# Patient Record
Sex: FEMALE | Race: BLACK OR AFRICAN AMERICAN | Employment: FULL TIME | URBAN - METROPOLITAN AREA
[De-identification: names, ages, dates, MRNs, and addresses within clinical notes are randomized per-mention and may not be internally consistent; named-entity substitution may affect disease eponyms.]

---

## 2017-03-24 ENCOUNTER — OFFICE VISIT (OUTPATIENT)
Dept: INTERNAL MEDICINE CLINIC | Age: 32
End: 2017-03-24

## 2017-03-24 VITALS
DIASTOLIC BLOOD PRESSURE: 78 MMHG | TEMPERATURE: 98 F | HEIGHT: 62 IN | OXYGEN SATURATION: 98 % | HEART RATE: 87 BPM | SYSTOLIC BLOOD PRESSURE: 118 MMHG | RESPIRATION RATE: 16 BRPM | BODY MASS INDEX: 43.52 KG/M2 | WEIGHT: 236.5 LBS

## 2017-03-24 DIAGNOSIS — Z00.00 ROUTINE GENERAL MEDICAL EXAMINATION AT A HEALTH CARE FACILITY: Primary | ICD-10-CM

## 2017-03-24 NOTE — PATIENT INSTRUCTIONS

## 2017-03-24 NOTE — PROGRESS NOTES
1. Have you been to the ER, urgent care clinic since your last visit? Hospitalized since your last visit? No    2. Have you seen or consulted any other health care providers outside of the [address] [place] [address] since your last visit? Include any pap smears or colon screening.  No

## 2017-03-24 NOTE — PROGRESS NOTES
HPI:   Check up  Needs form completed stating she is low risk for active TB  w/o chest pain/abd. discomfort; no dyspnea, cough or pedal edema; denies constitutional complaints of fever, night sweats or wt loss; no evidence of GI/ hemorrhage; no polyuria/polydipsia. Activity is age appropriate. ROS is otherwise negative. Past Medical History:   Diagnosis Date    Asthma     Hirsutism     Mixed hyperlipidemia     Positive PPD 2006    rx'd with INH       History reviewed. No pertinent surgical history. Social History     Social History    Marital status: SINGLE     Spouse name: N/A    Number of children: N/A    Years of education: N/A     Occupational History    Not on file. Social History Main Topics    Smoking status: Never Smoker    Smokeless tobacco: Never Used    Alcohol use Yes      Comment: occasional ETOH    Drug use: No    Sexual activity: Yes     Partners: Male     Birth control/ protection: Pill     Other Topics Concern    Not on file     Social History Narrative       No Known Allergies    Family History   Problem Relation Age of Onset    Diabetes Mother     Hypertension Mother     Elevated Lipids Mother     Elevated Lipids Father        Current Outpatient Prescriptions   Medication Sig Dispense Refill    MICROGESTIN 1/20, 21, 1-20 mg-mcg tab   6           Visit Vitals    /78 (BP 1 Location: Left arm, BP Patient Position: Sitting)    Pulse 87    Temp 98 °F (36.7 °C) (Tympanic)    Resp 16    Ht 5' 2\" (1.575 m)    Wt 236 lb 8 oz (107.3 kg)    SpO2 98%    BMI 43.26 kg/m2       PE  Well nourished in NAD  HEENT: PERRLA, EOMI;  OP: clear. Neck: supple w/o mass or bruits. Chest: clear. CV: RRR w/o m,r,g; pulses intact. Abd: soft, NT, w/o HSM or mass. Ext: w/o edema. Neuro: NF. Assessment and Plan    Encounter Diagnoses   Name Primary?     Routine general medical examination at a health care facility Yes   Except for increased weight, pt appears well  Completed form stating she has no evidence of active TB  I have reviewed/discussed the above normal BMI with the patient. I have recommended the following interventions: dietary management education, guidance, and counseling . The plan is as follows: I have counseled this patient on diet and exercise regimens.  .    Labs soon to assess metabolic parameters  Gyn care done elsewhere  OV 12 mos or prn  I have explained plan to patient and the patient verbalizes understanding

## 2017-10-18 ENCOUNTER — OFFICE VISIT (OUTPATIENT)
Dept: INTERNAL MEDICINE CLINIC | Age: 32
End: 2017-10-18

## 2017-10-18 VITALS
WEIGHT: 246 LBS | TEMPERATURE: 98.1 F | HEART RATE: 95 BPM | OXYGEN SATURATION: 97 % | RESPIRATION RATE: 16 BRPM | DIASTOLIC BLOOD PRESSURE: 78 MMHG | HEIGHT: 62 IN | SYSTOLIC BLOOD PRESSURE: 110 MMHG | BODY MASS INDEX: 45.27 KG/M2

## 2017-10-18 DIAGNOSIS — J30.89 ENVIRONMENTAL AND SEASONAL ALLERGIES: ICD-10-CM

## 2017-10-18 DIAGNOSIS — H10.32 ACUTE CONJUNCTIVITIS OF LEFT EYE, UNSPECIFIED ACUTE CONJUNCTIVITIS TYPE: Primary | ICD-10-CM

## 2017-10-18 RX ORDER — CIPROFLOXACIN HYDROCHLORIDE 3.5 MG/ML
SOLUTION/ DROPS TOPICAL
Qty: 10 ML | Refills: 0 | Status: SHIPPED | OUTPATIENT
Start: 2017-10-18 | End: 2018-03-23

## 2017-10-18 NOTE — PROGRESS NOTES
1. Have you been to the ER, urgent care clinic since your last visit? Hospitalized since your last visit? Yes When: oct [facility] Reason for visit: sore throat    2. Have you seen or consulted any other health care providers outside of the [address] [place] [address] since your last visit? Include any pap smears or colon screening.  No

## 2017-10-18 NOTE — PROGRESS NOTES
HPI:   (L) eye irritation x 3 days w/o change in acuity, hx of FB  +seasonal allergies  No fever    ROS is otherwise negative. Past Medical History:   Diagnosis Date    Asthma     Hirsutism     Mixed hyperlipidemia     Positive PPD 2006    rx'd with INH       History reviewed. No pertinent surgical history. Social History     Social History    Marital status: SINGLE     Spouse name: N/A    Number of children: N/A    Years of education: N/A     Occupational History    Not on file. Social History Main Topics    Smoking status: Never Smoker    Smokeless tobacco: Never Used    Alcohol use Yes      Comment: occasional ETOH    Drug use: No    Sexual activity: Yes     Partners: Male     Birth control/ protection: Pill     Other Topics Concern    Not on file     Social History Narrative       No Known Allergies    Family History   Problem Relation Age of Onset    Diabetes Mother     Hypertension Mother     Elevated Lipids Mother     Elevated Lipids Father        Current Outpatient Prescriptions   Medication Sig Dispense Refill    MICROGESTIN 1/20, 21, 1-20 mg-mcg tab   6           Visit Vitals    /78 (BP 1 Location: Right arm, BP Patient Position: Sitting)    Pulse 95    Temp 98.1 °F (36.7 °C) (Tympanic)    Resp 16    Ht 5' 2\" (1.575 m)    Wt 246 lb (111.6 kg)    SpO2 97%    BMI 44.99 kg/m2       PE  Well nourished in NAD  HEENT: PERRLA, EOMI;  Mild conjunctival injection (L); OP: clear. Neck: supple w/o mass or bruits. Chest: clear. CV: RRR w/o m,r,g; pulses intact. Abd: soft, NT, w/o HSM or mass. Ext: w/o edema. Neuro: NF. Assessment and Plan    Encounter Diagnoses   Name Primary?     Acute conjunctivitis of left eye, unspecified acute conjunctivitis type Yes    Environmental and seasonal allergies      Conjunctivitis in setting of seasonal allergies  claritin prn; cipro eye drops 2 tid x 3 days; f/u worsening or unimproved 3 days  I have reviewed/discussed the above normal BMI with the patient. I have recommended the following interventions: dietary management education, guidance, and counseling .  .    OV 6 mos or prn  I have explained plan to patient and the patient verbalizes understanding

## 2017-10-18 NOTE — PATIENT INSTRUCTIONS
Pinkeye: Care Instructions  Your Care Instructions    Pinkeye is redness and swelling of the eye surface and the conjunctiva (the lining of the eyelid and the covering of the white part of the eye). Pinkeye is also called conjunctivitis. Pinkeye is often caused by infection with bacteria or a virus. Dry air, allergies, smoke, and chemicals are other common causes. Pinkeye often clears on its own in 7 to 10 days. Antibiotics only help if the pinkeye is caused by bacteria. Pinkeye caused by infection spreads easily. If an allergy or chemical is causing pinkeye, it will not go away unless you can avoid whatever is causing it. Follow-up care is a key part of your treatment and safety. Be sure to make and go to all appointments, and call your doctor if you are having problems. It's also a good idea to know your test results and keep a list of the medicines you take. How can you care for yourself at home? · Wash your hands often. Always wash them before and after you treat pinkeye or touch your eyes or face. · Use moist cotton or a clean, wet cloth to remove crust. Wipe from the inside corner of the eye to the outside. Use a clean part of the cloth for each wipe. · Put cold or warm wet cloths on your eye a few times a day if the eye hurts. · Do not wear contact lenses or eye makeup until the pinkeye is gone. Throw away any eye makeup you were using when you got pinkeye. Clean your contacts and storage case. If you wear disposable contacts, use a new pair when your eye has cleared and it is safe to wear contacts again. · If the doctor gave you antibiotic ointment or eyedrops, use them as directed. Use the medicine for as long as instructed, even if your eye starts looking better soon. Keep the bottle tip clean, and do not let it touch the eye area. · To put in eyedrops or ointment:  ¨ Tilt your head back, and pull your lower eyelid down with one finger.   ¨ Drop or squirt the medicine inside the lower lid.  ¨ Close your eye for 30 to 60 seconds to let the drops or ointment move around. ¨ Do not touch the ointment or dropper tip to your eyelashes or any other surface. · Do not share towels, pillows, or washcloths while you have pinkeye. When should you call for help? Call your doctor now or seek immediate medical care if:  · You have pain in your eye, not just irritation on the surface. · You have a change in vision or loss of vision. · You have an increase in discharge from the eye. · Your eye has not started to improve or begins to get worse within 48 hours after you start using antibiotics. · Pinkeye lasts longer than 7 days. Watch closely for changes in your health, and be sure to contact your doctor if you have any problems. Where can you learn more? Go to http://nita-sandip.info/. Enter Y392 in the search box to learn more about \"Pinkeye: Care Instructions. \"  Current as of: March 20, 2017  Content Version: 11.3  © 9515-2992 [employer]. Care instructions adapted under license by [employer] (which disclaims liability or warranty for this information). If you have questions about a medical condition or this instruction, always ask your healthcare professional. [address] any warranty or liability for your use of this information.

## 2018-03-23 ENCOUNTER — OFFICE VISIT (OUTPATIENT)
Dept: INTERNAL MEDICINE CLINIC | Age: 33
End: 2018-03-23

## 2018-03-23 VITALS
HEART RATE: 98 BPM | WEIGHT: 250 LBS | OXYGEN SATURATION: 98 % | DIASTOLIC BLOOD PRESSURE: 76 MMHG | HEIGHT: 62 IN | BODY MASS INDEX: 46.01 KG/M2 | TEMPERATURE: 98.6 F | SYSTOLIC BLOOD PRESSURE: 126 MMHG

## 2018-03-23 DIAGNOSIS — Z92.89 HISTORY OF POSITIVE PPD: Primary | ICD-10-CM

## 2018-03-23 NOTE — PROGRESS NOTES
Dane Aguilera is a 27 y.o. female presenting today for Ear Fullness (2 months ) and Physical (needs PPD Converter Exam )  . HPI:  Dane Aguilera presents to the office today for tuberculosis assessment. 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 She was treated with interferon x 9 months and reports not symptoms of TB since. She presents today denying any cough, hemoptysis, dyspnea, weight loss or fever. Review of Systems   Constitutional: Negative for chills, fever and weight loss. Respiratory: Negative for cough, sputum production and shortness of breath. Cardiovascular: Negative for chest pain and palpitations. No Known Allergies    Current Outpatient Prescriptions   Medication Sig Dispense Refill    MICROGESTIN 1/20, 21, 1-20 mg-mcg tab   6       Past Medical History:   Diagnosis Date    Asthma     Hirsutism     Mixed hyperlipidemia     Positive PPD 2006    rx'd with INH       History reviewed. No pertinent surgical history. Social History     Social History    Marital status: SINGLE     Spouse name: N/A    Number of children: N/A    Years of education: N/A     Occupational History    Not on file. Social History Main Topics    Smoking status: Never Smoker    Smokeless tobacco: Never Used    Alcohol use Yes      Comment: occasional ETOH    Drug use: No    Sexual activity: Yes     Partners: Male     Birth control/ protection: Pill     Other Topics Concern    Not on file     Social History Narrative       Patient does not have an advanced directive on file    Vitals:    03/23/18 1326   BP: 126/76   Pulse: 98   Temp: 98.6 °F (37 °C)   TempSrc: Tympanic   SpO2: 98%   Weight: 250 lb (113.4 kg)   Height: 5' 2\" (1.575 m)   PainSc:   0 - No pain       Physical Exam   Constitutional: No distress. HENT:   Right Ear: External ear normal.   Left Ear: External ear normal.   Bilateral ear exam normal   Cardiovascular: Normal rate, regular rhythm and normal heart sounds. Pulmonary/Chest: Effort normal and breath sounds normal.   Lymphadenopathy:     She has no cervical adenopathy. Nursing note and vitals reviewed. No visits with results within 3 Month(s) from this visit. Latest known visit with results is:    Hospital Outpatient Visit on 02/15/2016   Component Date Value Ref Range Status    Sodium 02/15/2016 138  136 - 145 mmol/L Final    Potassium 02/15/2016 4.3  3.5 - 5.5 mmol/L Final    Chloride 02/15/2016 103  100 - 108 mmol/L Final    CO2 02/15/2016 27  21 - 32 mmol/L Final    Anion gap 02/15/2016 8  3.0 - 18 mmol/L Final    Glucose 02/15/2016 87  74 - 99 mg/dL Final    BUN 02/15/2016 11  7.0 - 18 MG/DL Final    Creatinine 02/15/2016 0.90  0.6 - 1.3 MG/DL Final    BUN/Creatinine ratio 02/15/2016 12  12 - 20   Final    GFR est AA 02/15/2016 >60  >60 ml/min/1.73m2 Final    GFR est non-AA 02/15/2016 >60  >60 ml/min/1.73m2 Final    Comment: (NOTE)  Estimated GFR is calculated using the Modification of Diet in Renal   Disease (MDRD) Study equation, reported for both  Americans   (GFRAA) and non- Americans (GFRNA), and normalized to 1.73m2   body surface area. The physician must decide which value applies to   the patient. The MDRD study equation should only be used in   individuals age 25 or older. It has not been validated for the   following: pregnant women, patients with serious comorbid conditions,   or on certain medications, or persons with extremes of body size,   muscle mass, or nutritional status.  Calcium 02/15/2016 9.0  8.5 - 10.1 MG/DL Final    Bilirubin, total 02/15/2016 0.3  0.2 - 1.0 MG/DL Final    ALT (SGPT) 02/15/2016 23  13 - 56 U/L Final    AST (SGOT) 02/15/2016 15  15 - 37 U/L Final    Alk.  phosphatase 02/15/2016 46  45 - 117 U/L Final    Protein, total 02/15/2016 8.0  6.4 - 8.2 g/dL Final    Albumin 02/15/2016 3.6  3.4 - 5.0 g/dL Final    Globulin 02/15/2016 4.4* 2.0 - 4.0 g/dL Final    A-G Ratio 02/15/2016 0.8 0.8 - 1.7   Final    LIPID PROFILE 02/15/2016        Final    Cholesterol, total 02/15/2016 200* <200 MG/DL Final    Triglyceride 02/15/2016 58  <150 MG/DL Final    HDL Cholesterol 02/15/2016 73* 40 - 60 MG/DL Final    LDL, calculated 02/15/2016 115.4* 0 - 100 MG/DL Final    VLDL, calculated 02/15/2016 11.6  MG/DL Final    CHOL/HDL Ratio 02/15/2016 2.7  0 - 5.0   Final    WBC 02/15/2016 4.9  4.6 - 13.2 K/uL Final    RBC 02/15/2016 4.71  4.20 - 5.30 M/uL Final    HGB 02/15/2016 13.8  12.0 - 16.0 g/dL Final    HCT 02/15/2016 41.8  35.0 - 45.0 % Final    MCV 02/15/2016 88.7  74.0 - 97.0 FL Final    MCH 02/15/2016 29.3  24.0 - 34.0 PG Final    MCHC 02/15/2016 33.0  31.0 - 37.0 g/dL Final    RDW 02/15/2016 12.8  11.6 - 14.5 % Final    PLATELET 78/91/5271 800* 135 - 420 K/uL Final    MPV 02/15/2016 9.7  9.2 - 11.8 FL Final    NEUTROPHILS 02/15/2016 44  40 - 73 % Final    LYMPHOCYTES 02/15/2016 45  21 - 52 % Final    MONOCYTES 02/15/2016 10  3 - 10 % Final    EOSINOPHILS 02/15/2016 1  0 - 5 % Final    BASOPHILS 02/15/2016 0  0 - 2 % Final    ABS. NEUTROPHILS 02/15/2016 2.1  1.8 - 8.0 K/UL Final    ABS. LYMPHOCYTES 02/15/2016 2.2  0.9 - 3.6 K/UL Final    ABS. MONOCYTES 02/15/2016 0.5  0.05 - 1.2 K/UL Final    ABS. EOSINOPHILS 02/15/2016 0.1  0.0 - 0.4 K/UL Final    ABS. BASOPHILS 02/15/2016 0.0  0.0 - 0.06 K/UL Final    DF 02/15/2016 AUTOMATED    Final    TSH 02/15/2016 0.87  0.36 - 3.74 uIU/mL Final       .No results found for any visits on 03/23/18. Assessment / Plan:      ICD-10-CM ICD-9-CM    1. History of positive PPD R76.11 795.51      TB assessment completed  No signs or symptoms of active TB  F/u prn    Follow-up Disposition:  Return if symptoms worsen or fail to improve. I asked the patient if she  had any questions and answered her  questions.   The patient stated that she understands the treatment plan and agrees with the treatment plan

## 2021-07-17 ENCOUNTER — HOSPITAL ENCOUNTER (OUTPATIENT)
Dept: LAB | Age: 36
Discharge: HOME | End: 2021-07-17

## 2021-07-17 LAB — SENTARA SPECIMEN COL,SENBCF: NORMAL

## 2021-07-17 PROCEDURE — 99001 SPECIMEN HANDLING PT-LAB: CPT

## 2021-09-11 ENCOUNTER — HOSPITAL ENCOUNTER (OUTPATIENT)
Dept: LAB | Age: 36
Discharge: HOME | End: 2021-09-11

## 2022-03-30 ENCOUNTER — OFFICE VISIT (OUTPATIENT)
Dept: INTERNAL MEDICINE CLINIC | Age: 37
End: 2022-03-30
Payer: COMMERCIAL

## 2022-03-30 VITALS
RESPIRATION RATE: 18 BRPM | OXYGEN SATURATION: 98 % | BODY MASS INDEX: 48.58 KG/M2 | TEMPERATURE: 97.4 F | HEART RATE: 91 BPM | WEIGHT: 264 LBS | DIASTOLIC BLOOD PRESSURE: 81 MMHG | HEIGHT: 62 IN | SYSTOLIC BLOOD PRESSURE: 125 MMHG

## 2022-03-30 DIAGNOSIS — E66.9 OBESITY (BMI 30-39.9): ICD-10-CM

## 2022-03-30 DIAGNOSIS — R05.9 COUGH: ICD-10-CM

## 2022-03-30 DIAGNOSIS — R31.29 MICROSCOPIC HEMATURIA: ICD-10-CM

## 2022-03-30 DIAGNOSIS — H40.9 GLAUCOMA, UNSPECIFIED GLAUCOMA TYPE, UNSPECIFIED LATERALITY: ICD-10-CM

## 2022-03-30 DIAGNOSIS — D75.839 THROMBOCYTOSIS: ICD-10-CM

## 2022-03-30 DIAGNOSIS — E78.2 MIXED HYPERLIPIDEMIA: ICD-10-CM

## 2022-03-30 DIAGNOSIS — J45.20 MILD INTERMITTENT ASTHMA WITHOUT COMPLICATION: ICD-10-CM

## 2022-03-30 DIAGNOSIS — R41.840 DIFFICULTY CONCENTRATING: Primary | ICD-10-CM

## 2022-03-30 DIAGNOSIS — F90.9 ATTENTION DEFICIT HYPERACTIVITY DISORDER (ADHD), UNSPECIFIED ADHD TYPE: ICD-10-CM

## 2022-03-30 PROCEDURE — 99204 OFFICE O/P NEW MOD 45 MIN: CPT | Performed by: INTERNAL MEDICINE

## 2022-03-30 RX ORDER — ALBUTEROL SULFATE 90 UG/1
2 AEROSOL, METERED RESPIRATORY (INHALATION)
Qty: 1 EACH | Refills: 5 | Status: SHIPPED | OUTPATIENT
Start: 2022-03-30

## 2022-03-30 RX ORDER — BRIMONIDINE TARTRATE, TIMOLOL MALEATE 2; 5 MG/ML; MG/ML
SOLUTION/ DROPS OPHTHALMIC
COMMUNITY
Start: 2022-03-21

## 2022-03-30 NOTE — PROGRESS NOTES
Cresencio Carr presents today for   Chief Complaint   Patient presents with    New Patient    Behavioral Problem     Patient wants to be screened for ADHD       1. \"Have you been to the ER, urgent care clinic since your last visit? Hospitalized since your last visit? \" new patient    2. \"Have you seen or consulted any other health care providers outside of the [address] [place] [address] since your last visit? \" yes     3. For patients aged 39-70: Has the patient had a colonoscopy / FIT/ Cologuard? NA - based on age      If the patient is female:    4. For patients aged 41-77: Has the patient had a mammogram within the past 2 years? NA - based on age or sex  See top three    5. For patients aged 21-65: Has the patient had a pap smear?  Yes - no Care Gap present

## 2022-03-30 NOTE — PROGRESS NOTES
[facility] OF [facility]:  3/30/2022, MRN: [id number]      Job Moore is a 43 y.o. female and presents to clinic as a New Patient and Behavioral Problem (Patient wants to be screened for ADHD)      Subjective:   She is a 43yo female with h/o thrombocytosis, previous positive PPD, glaucoma, microscopic hematuria (evaluated by [facility]), asthma, ADHD, allergic rhinitis, HLD, and obesity. 1. Thrombocytosis: Followed by [facility]. Next apt is in May. She has more frequent HAs. 2. Cough: Present since October. No SOB. Sx are more pronounced with going to work. Sputum: \"Sometimes in the morning. \" +Covid infection hx x 2. +Vaccinated against Covid infection. Her CXR from 10/21 was unremarkable. She had another one recently through her job. She had a positive PPD 16 yrs ago. She completed rx. No wheezing. Not on any rx for allergies. S/p turbinate reduction given her h/o allergy sx. 3. Health Maintenance:  - Her last PAP: December 2021. It was normal.  - No tobacco. She rarely drinks ETOH. No drug use   - No energy drinks. 4. H/o Microscopic Hematuria: She has a h/o microscopic hematuria - followed by Urology in the past. No urinary sx. 5. ADHD: She has a hard time getting to work on time. She has worked at the same place for >10 yrs. \"My sleep sometimes gets disturbed by the dog. \" \"I just can't get out of bed. \" No depression. She is doing well with completing tasks at work. She is taking classes at home. She has issues with tardiness even when she was younger. She works as an  at [employer]. \"It's not a bad job. It's redundant. \"     6. Asthma: She does not need an albuterol inhaler. +Exercise induced. She had it in her teens. She does not wheeze unless triggered from cold weather these days. 7. Glaucoma: Followed by . +Wearing glasses. On combigan rx. She sees her doctor every 3 months. 8. HLD: Her LDL was 115 in 2016. Not on rx. She went to a nutritionist in the past. She wants to lose weight. BMI is 48. Weight is 264lbs. Patient Active Problem List    Diagnosis Date Noted    Asthma     Mixed hyperlipidemia     Hirsutism        Current Outpatient Medications   Medication Sig Dispense Refill    Combigan 0.2-0.5 % drop ophthalmic solution          No Known Allergies    Past Medical History:   Diagnosis Date    Arthritis 12/6/13    R ankle    Asthma     Hirsutism     Microhematuria     Mixed hyperlipidemia     Positive PPD 2006    rx'd with INH       Past Surgical History:   Procedure Laterality Date    HX BREAST REDUCTION  2011    HX HEENT  2011    Nasal Turbinate    HX ORTHOPAEDIC Right 2014    Ankle     HX SEPTOPLASTY  2012       Family History   Problem Relation Age of Onset    Diabetes Mother     Hypertension Mother    [facility] Elevated Lipids Mother     Kidney Disease Mother     Elevated Lipids Father        Social History     Tobacco Use    Smoking status: Never Smoker    Smokeless tobacco: Never Used   Substance Use Topics    Alcohol use: Yes     Alcohol/week: 0.0 standard drinks     Comment: occasional ETOH       ROS   Review of Systems   Constitutional: Negative for chills and fever. HENT: Negative for ear pain and sore throat. Eyes: Negative for blurred vision and pain. Respiratory: Positive for cough. Negative for shortness of breath. Cardiovascular: Negative for chest pain. Gastrointestinal: Negative for abdominal pain, blood in stool and melena. Genitourinary: Negative for dysuria and hematuria. Musculoskeletal: Negative for joint pain and myalgias. Skin: Negative for rash. Neurological: Positive for headaches. Endo/Heme/Allergies: Does not bruise/bleed easily. Psychiatric/Behavioral: Negative for depression and substance abuse.        Objective     Vitals:    03/30/22 1139   BP: 125/81   Pulse: 91   Resp: 18   Temp: 97.4 °F (36.3 °C)   TempSrc: Temporal   SpO2: 98%   Weight: 264 lb (119.7 kg)   Height: 5' 2\" (1.575 m)   PainSc:   0 - No pain   LMP: 03/03/2022       Physical Exam  Vitals and nursing note reviewed. HENT:      Head: Normocephalic and atraumatic. Right Ear: External ear normal.      Left Ear: External ear normal.   Eyes:      General: No scleral icterus. Right eye: No discharge. Left eye: No discharge. Conjunctiva/sclera: Conjunctivae normal.   Cardiovascular:      Rate and Rhythm: Normal rate and regular rhythm. Heart sounds: Normal heart sounds. No murmur heard. No friction rub. No gallop. Pulmonary:      Effort: Pulmonary effort is normal. No respiratory distress. Breath sounds: Normal breath sounds. No wheezing or rales. Abdominal:      General: Bowel sounds are normal. There is no distension. Palpations: Abdomen is soft. There is no mass. Tenderness: There is no abdominal tenderness. There is no guarding or rebound. Musculoskeletal:         General: No swelling (BUE) or tenderness (BUE). Cervical back: Neck supple. Lymphadenopathy:      Cervical: No cervical adenopathy. Skin:     General: Skin is warm and dry. Findings: No erythema or rash. Neurological:      Mental Status: She is alert. Motor: No abnormal muscle tone.       Gait: Gait normal.   Psychiatric:         Mood and Affect: Mood normal.         LABS   Data Review:   Lab Results   Component Value Date/Time    WBC 4.9 02/15/2016 11:45 AM    HGB 13.8 02/15/2016 11:45 AM    HCT 41.8 02/15/2016 11:45 AM    PLATELET 876 (H) 54/01/5495 11:45 AM    MCV 88.7 02/15/2016 11:45 AM       Lab Results   Component Value Date/Time    Sodium 138 02/15/2016 11:45 AM    Potassium 4.3 02/15/2016 11:45 AM    Chloride 103 02/15/2016 11:45 AM    CO2 27 02/15/2016 11:45 AM    Anion gap 8 02/15/2016 11:45 AM    Glucose 87 02/15/2016 11:45 AM    BUN 11 02/15/2016 11:45 AM    Creatinine 0.90 02/15/2016 11:45 AM    BUN/Creatinine ratio 12 02/15/2016 11:45 AM    GFR est AA >60 02/15/2016 11:45 AM    GFR est non-AA >60 02/15/2016 11:45 AM    Calcium 9.0 02/15/2016 11:45 AM       Lab Results   Component Value Date/Time    Cholesterol, total 200 (H) 02/15/2016 11:45 AM    HDL Cholesterol 73 (H) 02/15/2016 11:45 AM    LDL, calculated 115.4 (H) 02/15/2016 11:45 AM    VLDL, calculated 11.6 02/15/2016 11:45 AM    Triglyceride 58 02/15/2016 11:45 AM    CHOL/HDL Ratio 2.7 02/15/2016 11:45 AM       No results found for: HBA1C, PRD7CCND, [id number]    Assessment/Plan:   1. Difficulty concentrating: Environmental reasons? - Referral placed for a neuropsychological assessment for evaluation/completeness  - Good sleep hygiene recommended. ORDERS:  - REFERRAL TO NEUROPSYCHOLOGY    2. Mixed hyperlipidemia: Overdue for labs. - Checking labs. - I encouraged her to reduce her weight  - We discussed weight loss options, including seeing a nutritionist again and pharmacotherapy options. She will let me know if she is interested in any of these options in the future. ORDERS:  - METABOLIC PANEL, COMPREHENSIVE; Future  - HEMOGLOBIN A1C WITH EAG; Future  - LIPID PANEL; Future    3. Thrombocytosis:   - Requesting records from Hematology  - Checking a [address] to f/u with Hematology for routine check ups. ORDERS:  - CBC WITH AUTOMATED DIFF; Future    4. Cough and Asthma Hx:  - Ok to use albuterol prn. Albuterol ordered. - Notify me if sx worsen. 5. Microscopic hematuria: Asymptomatic. Observation. 6. Glaucoma: Stable. - C/w rx eye drops and routine eye exams  - Requesting records. 7. Health Maintenance:  - Requesting her last PAP      Health Maintenance Due   Topic Date Due    Hepatitis C Screening  Never done    COVID-19 Vaccine (1) Never done     Lab review: labs are reviewed in the EHR and ordered as mentioned above    I have discussed the diagnosis with the patient and the intended plan as seen in the above orders.   The patient has received an after-visit summary and questions were answered concerning future plans. I have discussed medication side effects and warnings with the patient as well. I have reviewed the plan of care with the patient, accepted their input and they are in agreement with the treatment goals. All questions were answered. The patient understands the plan of care. Handouts provided today with above information. Pt instructed if symptoms worsen to call the office or report to the ED for continued care. Greater than 50% of the visit time was spent in counseling and/or coordination of care. Voice recognition was used to generate this report, which may have resulted in some phonetic based errors in grammar and contents. Even though attempts were made to correct all the mistakes, some may have been missed, and remained in the body of the document.           Nisha Dickerson MD

## 2022-03-30 NOTE — PATIENT INSTRUCTIONS
Complete Blood Count (CBC): About This Test  What is it? A complete blood count (CBC) is a blood test that gives important information about your blood cells, especially red blood cells, white blood cells, and platelets. Why is this test done? A CBC may be done as part of a regular physical exam. There are many other reasons that a doctor may want this blood test, including to:  · Find the cause of symptoms such as fatigue, weakness, fever, bruising, or weight loss. · Check for anemia. · See how much blood has been lost if there is bleeding. · Diagnose polycythemia. · Check for an infection. · Diagnose diseases of the blood, such as leukemia. · Check how the body is dealing with some types of drug or radiation treatment. · Check how abnormal bleeding is affecting the blood cells and counts. · Screen for high and low values before a surgery. · See if there are too many or too few of certain types of cells. This may help find other conditions. For instance, too many eosinophils may be a sign of an allergy or asthma. A blood count can give valuable information about the general state of your health. How do you prepare for the test?  In general, there's nothing you have to do before this test, unless your doctor tells you to. How is the test done? A health professional uses a needle to take a blood sample, usually from the arm. What happens after the test?  · You will probably be able to go home right away. · You can go back to your usual activities right away. Follow-up care is a key part of your treatment and safety. Be sure to make and go to all appointments, and call your doctor if you are having problems. It's also a good idea to keep a list of the medicines you take. Ask your doctor when you can expect to have your test results. Where can you learn more?   Go to http://www.gray.com/  Enter W454 in the search box to learn more about \"Complete Blood Count (CBC): About This Test.\"  Current as of: June 17, 2021               Content Version: 13.2  © 0694-1552 Healthwise, Incorporated. Care instructions adapted under license by [employer] (which disclaims liability or warranty for this information). If you have questions about a medical condition or this instruction, always ask your healthcare professional. [place] [address] any warranty or liability for your use of this information.

## 2022-03-31 ENCOUNTER — PATIENT MESSAGE (OUTPATIENT)
Dept: NEUROLOGY | Age: 37
End: 2022-03-31

## 2022-04-07 PROBLEM — R31.29 MICROSCOPIC HEMATURIA: Status: ACTIVE | Noted: 2022-04-07

## 2022-04-07 PROBLEM — F90.9 ATTENTION DEFICIT HYPERACTIVITY DISORDER (ADHD): Status: ACTIVE | Noted: 2022-04-07

## 2022-04-07 PROBLEM — D75.839 THROMBOCYTOSIS: Status: ACTIVE | Noted: 2022-04-07

## 2022-04-07 PROBLEM — H40.9 GLAUCOMA: Status: ACTIVE | Noted: 2022-04-07

## 2022-04-11 PROBLEM — H40.9 GLAUCOMA: Status: ACTIVE | Noted: 2022-04-07

## 2022-04-11 PROBLEM — F90.9 ATTENTION DEFICIT HYPERACTIVITY DISORDER (ADHD): Status: ACTIVE | Noted: 2022-04-07

## 2022-04-11 PROBLEM — D75.839 THROMBOCYTOSIS: Status: ACTIVE | Noted: 2022-04-07

## 2022-04-11 PROBLEM — R31.29 MICROSCOPIC HEMATURIA: Status: ACTIVE | Noted: 2022-04-07

## 2022-05-05 ENCOUNTER — OFFICE VISIT (OUTPATIENT)
Dept: NEUROLOGY | Age: 37
End: 2022-05-05
Payer: COMMERCIAL

## 2022-05-05 DIAGNOSIS — R41.840 ATTENTION AND CONCENTRATION DEFICIT: Primary | ICD-10-CM

## 2022-05-05 PROCEDURE — 90791 PSYCH DIAGNOSTIC EVALUATION: CPT | Performed by: PSYCHOLOGIST

## 2022-05-05 NOTE — PROGRESS NOTES
[facility]  Neuroscience   [facility]. [facility], [address]  Office:  [phone]  Fax: [phone]                  Initial Office Exam  Patient Name: Yana Deng  Age: 43 y.o. Gender: female   Handedness: left handed   Presenting Concern: attention and concentration deficits  Primary Care Physician: Cornelius Hernandez MD  Referring Provider: Cornelius Hernandez MD      REASON FOR REFERRAL:  This comprehensive and medically necessary neuropsychological assessment was requested to assist a differential diagnosis of cognitive concerns. The use and purpose of this examination, as well as the extent and limitations of confidentiality, were explained prior to obtaining permission to participate. Instructions were provided regarding the necessity to put forth optimal effort and answer questions truthfully in order to obtain reliable and accurate test results. REVIEW OF RECORDS:  Ms. Emerald Harper was referred by her PCP for work-up of possible ADHD. She is having a hard time getting to work on time and completing tasks. A history of glaucoma is noted. Current Outpatient Medications   Medication Sig    Combigan 0.2-0.5 % drop ophthalmic solution     albuterol (PROVENTIL HFA, VENTOLIN HFA, PROAIR HFA) 90 mcg/actuation inhaler Take 2 Puffs by inhalation every six (6) hours as needed for Wheezing. No current facility-administered medications for this visit. Past Medical History:   Diagnosis Date    Arthritis 12/6/13    R ankle    Asthma     Hirsutism     Microhematuria     Mixed hyperlipidemia     Positive PPD 2006    rx'd with [facility]       Past Surgical History:   Procedure Laterality Date    HX BREAST REDUCTION  2011    HX HEENT  2011    Nasal Turbinate    HX ORTHOPAEDIC Right 2014    Ankle     HX SEPTOPLASTY  2012       CLINICAL INTERVIEW:  Ms. Emerald Harper was unaccompanied for her initial visit.   Consistent with records, she reported difficulties with attention and concentration. She has never before been evaluated for, or diagnosed with ADHD. Developmental history is unremarkable. Ms. Khushi Ernst was born on time and proceeded to meet developmental milestones as expected. She never required skilled therapies but does believe she would benefit from ST for a tongue-tie. Neurologic history is negative for seizures, stroke, syncope, and significant head trauma. Sleep disturbance is significant for snoring. There has been no previous sleep study. Pain complaints include occasional headaches. Alcohol use is rare. There is no tobacco or illicit substance use. Family history of neurologic illness was denied. With regard to emotional functioning, Ms. Rankin denied a history of psychiatric illness, psychiatric hospitalization, suicidal ideation, self-harm behaviors, psychotic symptoms, and psychological trauma. Socially, Ms. Bonilla has never been  and has no children. She lives with her sister. She has never had a group of friends but does desire increased social support. Family support was described as good. Mr. Mario Kay does not exercise regularly. Hobbies include tennis, basketball, and bowling as well as listening to music. Academically, Ms. Rankin completed her bachelor's degree at [address] [place] [address] in the area of athletic training. She has no history of LD. Vocationally, Ms. Khushi Ernst has been employed for more than 10 years at [employer] as an . Functionally, Ms. Khushi Ernst remains independent for ADL and IADL care. She continues to drive without incident.     MENTAL STATUS:    Sensorium  Awake, Aware, Alert   Orientation person, place, time/date, situation, day of week, month of year and year   Relations cooperative   Eye Contact appropriate   Appearance:  age appropriate   Motor Behavior:  within normal limits   Speech:  monotone   Vocabulary average   Thought Process: within normal limits   Thought Content free of delusions and free of hallucinations   Suicidal ideations none   Homicidal ideations none   Mood:  euthymic   Affect:  constricted   Memory recent  adequate   Memory remote:  adequate   Concentration:  adequate   Abstraction:  abstract   Insight:  fair   Reliability fair   Judgment:  fair         DIAGNOSTIC IMPRESSIONS:  1. R/O ADHD      PLAN:  1. Complete a comprehensive neuropsychological assessment to provide a differential diagnosis of presenting concerns as well as to assist with disposition and treatment planning as appropriate. 2. Consider compensatory and remedial cognitive training. 3. Consider nonpharmacological interventions for mood disorder. [id number] x 1 Review of records. Face to face interview w/ patient. Determine test protocol: 60 minutes. Total 1 unit      Tim Andrew, PHD  Licensed Clinical Psychologist    This note was created using voice recognition software. Despite editing, there may be syntax errors.

## 2022-05-28 ENCOUNTER — HOSPITAL ENCOUNTER (OUTPATIENT)
Dept: LAB | Age: 37
Discharge: HOME | End: 2022-05-28
Payer: COMMERCIAL

## 2022-05-28 DIAGNOSIS — E78.2 MIXED HYPERLIPIDEMIA: ICD-10-CM

## 2022-05-28 DIAGNOSIS — E66.9 OBESITY (BMI 30-39.9): ICD-10-CM

## 2022-05-28 DIAGNOSIS — D75.839 THROMBOCYTOSIS: ICD-10-CM

## 2022-05-28 LAB
ALBUMIN SERPL-MCNC: 3.5 G/DL (ref 3.4–5)
ALBUMIN/GLOB SERPL: 0.9 {RATIO} (ref 0.8–1.7)
ALP SERPL-CCNC: 46 U/L (ref 45–117)
ALT SERPL-CCNC: 23 U/L (ref 13–56)
ANION GAP SERPL CALC-SCNC: 5 MMOL/L (ref 3–18)
AST SERPL-CCNC: 14 U/L (ref 10–38)
BASOPHILS # BLD: 0 K/UL (ref 0–0.1)
BASOPHILS NFR BLD: 1 % (ref 0–2)
BILIRUB SERPL-MCNC: 0.2 MG/DL (ref 0.2–1)
BUN SERPL-MCNC: 15 MG/DL (ref 7–18)
BUN/CREAT SERPL: 18 (ref 12–20)
CALCIUM SERPL-MCNC: 8.8 MG/DL (ref 8.5–10.1)
CHLORIDE SERPL-SCNC: 106 MMOL/L (ref 100–111)
CHOLEST SERPL-MCNC: 209 MG/DL
CO2 SERPL-SCNC: 27 MMOL/L (ref 21–32)
CREAT SERPL-MCNC: 0.83 MG/DL (ref 0.6–1.3)
DIFFERENTIAL METHOD BLD: ABNORMAL
EOSINOPHIL # BLD: 0.2 K/UL (ref 0–0.4)
EOSINOPHIL NFR BLD: 3 % (ref 0–5)
ERYTHROCYTE [DISTWIDTH] IN BLOOD BY AUTOMATED COUNT: 12.4 % (ref 11.6–14.5)
EST. AVERAGE GLUCOSE BLD GHB EST-MCNC: 111 MG/DL
GLOBULIN SER CALC-MCNC: 3.9 G/DL (ref 2–4)
GLUCOSE SERPL-MCNC: 103 MG/DL (ref 74–99)
HBA1C MFR BLD: 5.5 % (ref 4.2–5.6)
HCT VFR BLD AUTO: 37.1 % (ref 35–45)
HDLC SERPL-MCNC: 77 MG/DL (ref 40–60)
HDLC SERPL: 2.7 {RATIO} (ref 0–5)
HGB BLD-MCNC: 12.3 G/DL (ref 12–16)
IMM GRANULOCYTES # BLD AUTO: 0 K/UL (ref 0–0.04)
IMM GRANULOCYTES NFR BLD AUTO: 0 % (ref 0–0.5)
LDLC SERPL CALC-MCNC: 120.2 MG/DL (ref 0–100)
LIPID PROFILE,FLP: ABNORMAL
LYMPHOCYTES # BLD: 2.4 K/UL (ref 0.9–3.6)
LYMPHOCYTES NFR BLD: 42 % (ref 21–52)
MCH RBC QN AUTO: 28.1 PG (ref 24–34)
MCHC RBC AUTO-ENTMCNC: 33.2 G/DL (ref 31–37)
MCV RBC AUTO: 84.9 FL (ref 78–100)
MONOCYTES # BLD: 0.6 K/UL (ref 0.05–1.2)
MONOCYTES NFR BLD: 11 % (ref 3–10)
NEUTS SEG # BLD: 2.4 K/UL (ref 1.8–8)
NEUTS SEG NFR BLD: 43 % (ref 40–73)
NRBC # BLD: 0 K/UL (ref 0–0.01)
NRBC BLD-RTO: 0 PER 100 WBC
PLATELET # BLD AUTO: 498 K/UL (ref 135–420)
PMV BLD AUTO: 9.3 FL (ref 9.2–11.8)
POTASSIUM SERPL-SCNC: 4.3 MMOL/L (ref 3.5–5.5)
PROT SERPL-MCNC: 7.4 G/DL (ref 6.4–8.2)
RBC # BLD AUTO: 4.37 M/UL (ref 4.2–5.3)
SODIUM SERPL-SCNC: 138 MMOL/L (ref 136–145)
TRIGL SERPL-MCNC: 59 MG/DL (ref ?–150)
VLDLC SERPL CALC-MCNC: 11.8 MG/DL
WBC # BLD AUTO: 5.6 K/UL (ref 4.6–13.2)

## 2022-05-28 PROCEDURE — 36415 COLL VENOUS BLD VENIPUNCTURE: CPT

## 2022-05-28 PROCEDURE — 83036 HEMOGLOBIN GLYCOSYLATED A1C: CPT

## 2022-05-28 PROCEDURE — 80053 COMPREHEN METABOLIC PANEL: CPT

## 2022-05-28 PROCEDURE — 80061 LIPID PANEL: CPT

## 2022-05-28 PROCEDURE — 85025 COMPLETE CBC W/AUTO DIFF WBC: CPT

## 2022-06-01 NOTE — PROGRESS NOTES
I will discuss her results with her upcoming appointment. Her CMP is unremarkable for significant abnormalities. Her A1c is normal.  Her total cholesterol is 209. Triglycerides are 59. HDL is 77. LDL is 120. Her CBC shows a persistently elevated platelet count. It is 498.     Dr. Delia Tomlin  [facility], [address] [place] [address], [address] [place], [place] [address]  Phone: [phone]  Fax: [phone]

## 2022-06-07 ENCOUNTER — OFFICE VISIT (OUTPATIENT)
Dept: INTERNAL MEDICINE CLINIC | Age: 37
End: 2022-06-07
Payer: COMMERCIAL

## 2022-06-07 VITALS
DIASTOLIC BLOOD PRESSURE: 74 MMHG | WEIGHT: 264 LBS | HEART RATE: 89 BPM | SYSTOLIC BLOOD PRESSURE: 121 MMHG | RESPIRATION RATE: 18 BRPM | BODY MASS INDEX: 48.58 KG/M2 | TEMPERATURE: 97 F | HEIGHT: 62 IN | OXYGEN SATURATION: 98 %

## 2022-06-07 DIAGNOSIS — E78.2 MIXED HYPERLIPIDEMIA: Primary | ICD-10-CM

## 2022-06-07 DIAGNOSIS — D75.839 THROMBOCYTOSIS: ICD-10-CM

## 2022-06-07 DIAGNOSIS — F90.9 ATTENTION DEFICIT HYPERACTIVITY DISORDER (ADHD), UNSPECIFIED ADHD TYPE: ICD-10-CM

## 2022-06-07 DIAGNOSIS — E66.01 OBESITY, CLASS III, BMI 40-49.9 (MORBID OBESITY) (HCC): ICD-10-CM

## 2022-06-07 PROCEDURE — 99214 OFFICE O/P EST MOD 30 MIN: CPT | Performed by: INTERNAL MEDICINE

## 2022-06-07 RX ORDER — NALTREXONE HYDROCHLORIDE AND BUPROPION HYDROCHLORIDE 8; 90 MG/1; MG/1
TABLET, EXTENDED RELEASE ORAL
Qty: 120 TABLET | Refills: 3 | Status: SHIPPED | OUTPATIENT
Start: 2022-06-07

## 2022-06-07 NOTE — PROGRESS NOTES
[facility]:  6/7/2022, MRN: [id number]      Renetta Hardy is a 43 y.o. female and presents to clinic for Follow-up and Labs (5-28-22)      Subjective:   She is a 41yo female with h/o thrombocytosis, previous positive PPD, Covid-19,  Glaucoma (followed by ), microscopic hematuria (evaluated by [facility]), asthma, ADHD, allergic rhinitis, HLD, and obesity.      1. Thrombocytosis: Persistent. Her recent platelet count in May measured 498. She sees [facility] for long term rx/evaluation. 2. HLD: Her recent labs show: Her CMP is unremarkable for significant abnormalities. Her A1c is normal.  Her total cholesterol is 209. Triglycerides are 59. HDL is 77. LDL is 120. She wants to lose weight. 3. ADHD: Suspected due to some difficulty with focusing at work. She denied depression sx at her last apt. She works as an  at [employer]. She was referred for a neuropsychological assessment and met with  in between apts. Her assessment is scheduled w/i the next month. Patient Active Problem List    Diagnosis Date Noted    Thrombocytosis 04/07/2022    Microscopic hematuria 04/07/2022    Attention deficit hyperactivity disorder (ADHD) 04/07/2022    Glaucoma 04/07/2022    Asthma     Mixed hyperlipidemia     Hirsutism        Current Outpatient Medications   Medication Sig Dispense Refill    Combigan 0.2-0.5 % drop ophthalmic solution       albuterol (PROVENTIL HFA, VENTOLIN HFA, PROAIR HFA) 90 mcg/actuation inhaler Take 2 Puffs by inhalation every six (6) hours as needed for Wheezing.  (Patient not taking: Reported on 6/7/2022) 1 Each 5       No Known Allergies    Past Medical History:   Diagnosis Date    Arthritis 12/6/13    R ankle    Asthma     Hirsutism     Microhematuria     Mixed hyperlipidemia     Positive PPD 2006    rx'd with [facility]       Past Surgical History:   Procedure Laterality Date    HX BREAST REDUCTION 2011    HX HEENT  2011    Nasal Turbinate    HX ORTHOPAEDIC Right 2014    Ankle     HX SEPTOPLASTY  2012       Family History   Problem Relation Age of Onset    Diabetes Mother     Hypertension Mother    [facility] Elevated Lipids Mother     Kidney Disease Mother     Elevated Lipids Father        Social History     Tobacco Use    Smoking status: Never Smoker    Smokeless tobacco: Never Used   Substance Use Topics    Alcohol use: Not Currently     Alcohol/week: 0.0 standard drinks     Comment: occasional ETOH       ROS   Review of Systems   Constitutional: Negative for chills and fever. HENT: Negative for ear pain and sore throat. Eyes: Negative for blurred vision and pain. Respiratory: Negative for cough and shortness of breath. Cardiovascular: Negative for chest pain. Gastrointestinal: Negative for abdominal pain, blood in stool and melena. Genitourinary: Negative for dysuria and hematuria. Musculoskeletal: Negative for joint pain and myalgias. Skin: Negative for rash. Neurological: Negative for headaches. Endo/Heme/Allergies: Does not bruise/bleed easily. Psychiatric/Behavioral: Negative for substance abuse. Objective     Vitals:    06/07/22 1210   BP: 121/74   Pulse: 89   Resp: 18   Temp: 97 °F (36.1 °C)   TempSrc: Temporal   SpO2: 98%   Weight: 264 lb (119.7 kg)   Height: 5' 2\" (1.575 m)   PainSc:   0 - No pain   LMP: 05/24/2022       Physical Exam  Vitals and nursing note reviewed. HENT:      Head: Normocephalic and atraumatic. Right Ear: External ear normal.      Left Ear: External ear normal.   Eyes:      General: No scleral icterus. Right eye: No discharge. Left eye: No discharge. Conjunctiva/sclera: Conjunctivae normal.   Cardiovascular:      Rate and Rhythm: Normal rate and regular rhythm. Heart sounds: Normal heart sounds. No murmur heard. No friction rub. No gallop.     Pulmonary:      Effort: Pulmonary effort is normal. No respiratory distress. Breath sounds: Normal breath sounds. No wheezing or rales. Abdominal:      General: Bowel sounds are normal. There is no distension. Palpations: Abdomen is soft. There is no mass. Tenderness: There is no abdominal tenderness. There is no guarding or rebound. Musculoskeletal:         General: No swelling (BUE) or tenderness (BUE). Cervical back: Neck supple. Lymphadenopathy:      Cervical: No cervical adenopathy. Skin:     General: Skin is warm and dry. Findings: No erythema or rash. Neurological:      Mental Status: She is alert. Motor: No abnormal muscle tone. Gait: Gait normal.   Psychiatric:         Mood and Affect: Mood normal.         LABS   Data Review:   Lab Results   Component Value Date/Time    WBC 5.6 05/28/2022 10:18 AM    HGB 12.3 05/28/2022 10:18 AM    HCT 37.1 05/28/2022 10:18 AM    PLATELET 617 (H) 82/15/9127 10:18 AM    MCV 84.9 05/28/2022 10:18 AM       Lab Results   Component Value Date/Time    Sodium 138 05/28/2022 10:18 AM    Potassium 4.3 05/28/2022 10:18 AM    Chloride 106 05/28/2022 10:18 AM    CO2 27 05/28/2022 10:18 AM    Anion gap 5 05/28/2022 10:18 AM    Glucose 103 (H) 05/28/2022 10:18 AM    BUN 15 05/28/2022 10:18 AM    Creatinine 0.83 05/28/2022 10:18 AM    BUN/Creatinine ratio 18 05/28/2022 10:18 AM    GFR est AA >60 05/28/2022 10:18 AM    GFR est non-AA >60 05/28/2022 10:18 AM    Calcium 8.8 05/28/2022 10:18 AM       Lab Results   Component Value Date/Time    Cholesterol, total 209 (H) 05/28/2022 10:18 AM    HDL Cholesterol 77 (H) 05/28/2022 10:18 AM    LDL, calculated 120.2 (H) 05/28/2022 10:18 AM    VLDL, calculated 11.8 05/28/2022 10:18 AM    Triglyceride 59 05/28/2022 10:18 AM    CHOL/HDL Ratio 2.7 05/28/2022 10:18 AM       Lab Results   Component Value Date/Time    Hemoglobin A1c 5.5 05/28/2022 10:18 AM       Assessment/Plan:   1. Obesity, Class III, BMI 40-49.9 (morbid obesity): BMI is 48. +HLD.  She does not fit criteria for rx for HLD. - Referral placed to a nutritionist.   - We will need to repeat her lipid panel next yr.  - I encouraged her to exercise as tolerated and to maintain a lower caloric intake. - Starting contrave. I will f/u with her to assess her response. ORDERS:  - naltrexone-buPROPion (Contrave) 8-90 mg TbER ER tablet; First Month: Contrave 8mg/90mg #70  Week 1 : 1 Tab AM  Week 2 : 1 Tab AM, 1 Tab PM  Week 3 : 2 Tab AM, 1 Tab PM  Week 4 : 2 Tab AM, 2 Tab PM  Week 5 and Beyond: Contrave 8mg/90mg #120   2 Tab AM, 2 Tab PM  Dispense: 120 Tablet; Refill: 3  - REFERRAL TO NUTRITION    2. Thrombocytosis: Persistent. - I encouraged her to f/u with her hematologist for further rx/evaluation. 3. Difficulty Focusing:   - I encouraged her to complete her neuropsychological assessment. Health Maintenance Due   Topic Date Due    Hepatitis C Screening  Never done    COVID-19 Vaccine (1) Never done     Lab review: labs are reviewed in the EHR    I have discussed the diagnosis with the patient and the intended plan as seen in the above orders. The patient has received an after-visit summary and questions were answered concerning future plans. I have discussed medication side effects and warnings with the patient as well. I have reviewed the plan of care with the patient, accepted their input and they are in agreement with the treatment goals. All questions were answered. The patient understands the plan of care. Handouts provided today with above information. Pt instructed if symptoms worsen to call the office or report to the ED for continued care. Greater than 50% of the visit time was spent in counseling and/or coordination of care. Voice recognition was used to generate this report, which may have resulted in some phonetic based errors in grammar and contents. Even though attempts were made to correct all the mistakes, some may have been missed, and remained in the body of the document.           Nicki Tellez Carmenza Jennings MD

## 2022-06-07 NOTE — PROGRESS NOTES
Ninoska Petersen presents today for   Chief Complaint   Patient presents with   [address]     5-28-22       1. \"Have you been to the ER, urgent care clinic since your last visit? Hospitalized since your last visit? \" no    2. \"Have you seen or consulted any other health care providers outside of the [address] [place] [address] since your last visit? \" yes     3. For patients aged 39-70: Has the patient had a colonoscopy / FIT/ Cologuard? NA - based on age      If the patient is female:    4. For patients aged 41-77: Has the patient had a mammogram within the past 2 years? NA - based on age or sex  See top three    5. For patients aged 21-65: Has the patient had a pap smear?  Yes - no Care Gap present

## 2022-07-12 ENCOUNTER — PATIENT MESSAGE (OUTPATIENT)
Dept: INTERNAL MEDICINE CLINIC | Age: 37
End: 2022-07-12

## 2022-07-25 ENCOUNTER — HOSPITAL ENCOUNTER (OUTPATIENT)
Dept: NUTRITION | Age: 37
Discharge: HOME | End: 2022-07-25
Payer: COMMERCIAL

## 2022-07-25 PROCEDURE — 97802 MEDICAL NUTRITION INDIV IN: CPT

## 2022-08-11 NOTE — PROGRESS NOTES
[address] [place] [address]   Nutrition Assessment - Medical Nutrition Therapy   Outpatient Initial Evaluation         Patient Name: Escobar Pascual : 1985   Treatment Diagnosis: Obesity, Class III, BMI 40-49.9    Referral Source: Raoul Sanchez MD [facility]): 2022     Gender: female Age: 42 y.o. Ht: 62 in Wt: 264  lb  kg   BMI: 45.4 BMR   Male  BMR Female 1753     Past Medical History:  Obesity, High Cholesterol      Pertinent Medications:   Includes: Wellbutrin, Albuterol     Biochemical Data:   Lab Results   Component Value Date/Time    Hemoglobin A1c 5.5 2022 10:18 AM     Lab Results   Component Value Date/Time    Sodium 138 2022 10:18 AM    Potassium 4.3 2022 10:18 AM    Chloride 106 2022 10:18 AM    CO2 27 2022 10:18 AM    Anion gap 5 2022 10:18 AM    Glucose 103 (H) 2022 10:18 AM    BUN 15 2022 10:18 AM    Creatinine 0.83 2022 10:18 AM    BUN/Creatinine ratio 18 2022 10:18 AM    GFR est AA >60 2022 10:18 AM    GFR est non-AA >60 2022 10:18 AM    Calcium 8.8 2022 10:18 AM    Bilirubin, total 0.2 2022 10:18 AM    Alk. phosphatase 46 2022 10:18 AM    Protein, total 7.4 2022 10:18 AM    Albumin 3.5 2022 10:18 AM    Globulin 3.9 2022 10:18 AM    A-G Ratio 0.9 2022 10:18 AM    ALT (SGPT) 23 2022 10:18 AM    AST (SGOT) 14 2022 10:18 AM     Lab Results   Component Value Date/Time    Cholesterol, total 209 (H) 2022 10:18 AM    HDL Cholesterol 77 (H) 2022 10:18 AM    LDL, calculated 120.2 (H) 2022 10:18 AM    VLDL, calculated 11.8 2022 10:18 AM    Triglyceride 59 2022 10:18 AM    CHOL/HDL Ratio 2.7 2022 10:18 AM     Lab Results   Component Value Date/Time    ALT (SGPT) 23 2022 10:18 AM    AST (SGOT) 14 2022 10:18 AM    Alk.  phosphatase 46 2022 10:18 AM    Bilirubin, total 0.2 2022 10:18 AM     Lab Results   Component Value Date/Time    Creatinine 0.83 05/28/2022 10:18 AM     Lab Results   Component Value Date/Time    BUN 15 05/28/2022 10:18 AM     No results found for: MCACR, MCA1, MCA2, MCA3, MCAU, MCAU2, MCALPOCT     Assessment:   Patient present to learn about nutrition related to weight loss. Patient reports that she lacks motivation to make lifestyle changes. Patient works a full-time job and attends online school in the evenings. Patient is mostly sedentary at her job. Patient prepares about 50% of her meals at home and relies on fast-food for the rest of her meals. Food & Nutrition: 24 Hour Recall:  Breakfast: protein bar  Lunch: tuna, Lay's chips  Dinner: chicken, rice, kale   Drinks: water, soda   Snacks: ice cream bars        Estimate Needs   Calories:  1900 Protein: 119 Carbs: 214 Fat: 63   Kcal/day  g/day  g/day  g/day        percent: 25  45  30               Nutrition Diagnosis   Obesity related to excessive carbohydrate intake as evidenced by 24 hour recall of carbohydrate dense meals and snacks (ice cream, chips, soda). Nutrition Intervention &  Education: Educated patient on the need for a consistent carbohydrate intake related to weight loss. Educated patient on nutrition label reading, emphasizing carbohydrates and serving size. Educated patient on protein sources, encouraged patient to incorporate mostly lean protein sources with all carbohydrates. Encouraged patient to exercise 150 minutes per week.     Handouts Provided: [x]  Carbohydrates  [x]  Protein  [x]  Non-starchy Vegatbles  [x]  Food Label  [x]  Meal and Snack Ideas  []  Food Journals []  Diabetes  []  Cholesterol  []  Sodium  [x]  Gen Nutr Guidelines  []  SBGM Guidelines  []  Others:   Information Reviewed with: Patient   Readiness to Change Stage: [x]  Pre-contemplative    []  Contemplative  []  Preparation               []  Action                  []  Maintenance   Potential Barriers to Learning: [] Decline in memory    []  Language barrier   []  Other:  []  Emotional                  []  Limited mobility  [x]  Lack of motivation     [] Vision, hearing or cognitive impairment   Expected Compliance: Fair      Nutritional Goal - To promote lifestyle changes to result in:    [x]  Weight loss  []  Improved diabetic control  []  Decreased cholesterol levels  []  Decreased blood pressure  []  Weight maintenance []  Preventing any interactions associated with food allergies  []  Adequate weight gain toward goal weight  []  Other:        Patient Goals:   1. Patient will consume three meals per day 4-5 hours apart. 2. Patient will include a protein at all meals and snacks. 3. Patient will drink 64 ounces of water daily.        Dietitian Signature: Mayco Fan RD Date: 8/11/2022   Follow-up:  Time: 11:27 AM

## 2022-08-24 ENCOUNTER — OFFICE VISIT (OUTPATIENT)
Dept: NEUROLOGY | Age: 37
End: 2022-08-24
Payer: COMMERCIAL

## 2022-08-24 DIAGNOSIS — F90.9 ATTENTION DEFICIT HYPERACTIVITY DISORDER (ADHD), UNSPECIFIED ADHD TYPE: Primary | ICD-10-CM

## 2022-08-24 DIAGNOSIS — F41.8 ANXIETY ABOUT HEALTH: ICD-10-CM

## 2022-08-24 PROCEDURE — 96136 PSYCL/NRPSYC TST PHY/QHP 1ST: CPT | Performed by: PSYCHOLOGIST

## 2022-08-24 PROCEDURE — 96131 PSYCL TST EVAL PHYS/QHP EA: CPT | Performed by: PSYCHOLOGIST

## 2022-08-24 PROCEDURE — 96137 PSYCL/NRPSYC TST PHY/QHP EA: CPT | Performed by: PSYCHOLOGIST

## 2022-08-24 PROCEDURE — 96138 PSYCL/NRPSYC TECH 1ST: CPT | Performed by: PSYCHOLOGIST

## 2022-08-24 PROCEDURE — 96130 PSYCL TST EVAL PHYS/QHP 1ST: CPT | Performed by: PSYCHOLOGIST

## 2022-08-24 PROCEDURE — 96139 PSYCL/NRPSYC TST TECH EA: CPT | Performed by: PSYCHOLOGIST

## 2022-08-24 NOTE — PROGRESS NOTES
[facility]  Neuroscience   [facility]. [facility], [address]  Office:  [phone]  Fax: [phone]                Neuropsychological Evaluation Report    Patient Name: Luis M Boateng  Age: 42 y.o. Gender: female   Handedness: left handed   Presenting Concern: attention and concentration deficits  Primary Care Physician: Adrienne Ho MD  Referring Provider: Adrienne Ho MD    PATIENT HISTORY (OBTAINED DURING INITIAL CLINICAL EVALUATION):    REASON FOR REFERRAL:  This comprehensive and medically necessary neuropsychological assessment was requested to assist a differential diagnosis of cognitive concerns. The use and purpose of this examination, as well as the extent and limitations of confidentiality, were explained prior to obtaining permission to participate. Instructions were provided regarding the necessity to put forth optimal effort and answer questions truthfully in order to obtain reliable and accurate test results. REVIEW OF RECORDS:  Ms. Lucila Molina was referred by her PCP for work-up of possible ADHD. She is having a hard time getting to work on time and completing tasks. A history of glaucoma is noted. Current Outpatient Medications   Medication Sig    naltrexone-buPROPion (Contrave) 8-90 mg TbER ER tablet First Month: Contrave 8mg/90mg #70  Week 1 : 1 Tab AM  Week 2 : 1 Tab AM, 1 Tab PM  Week 3 : 2 Tab AM, 1 Tab PM  Week 4 : 2 Tab AM, 2 Tab PM  Week 5 and Beyond: Contrave 8mg/90mg #120   2 Tab AM, 2 Tab PM    Combigan 0.2-0.5 % drop ophthalmic solution     albuterol (PROVENTIL HFA, VENTOLIN HFA, PROAIR HFA) 90 mcg/actuation inhaler Take 2 Puffs by inhalation every six (6) hours as needed for Wheezing. (Patient not taking: Reported on 6/7/2022)     No current facility-administered medications for this visit.        Past Medical History:   Diagnosis Date    Arthritis 12/6/13    R ankle    Asthma     Hirsutism     Microhematuria     Mixed hyperlipidemia     Positive PPD 2006    rx'd with [address]       Past Surgical History:   Procedure Laterality Date    HX BREAST REDUCTION  2011    HX HEENT  2011    Nasal Turbinate    HX ORTHOPAEDIC Right 2014    Ankle     HX SEPTOPLASTY  2012       CLINICAL INTERVIEW:  Ms. Judit Myers was unaccompanied for her initial visit. Consistent with records, she reported difficulties with attention and concentration. She has never before been evaluated for, or diagnosed with ADHD. Developmental history is unremarkable. Ms. Judit Myers was born on time and proceeded to meet developmental milestones as expected. She never required skilled therapies but does believe she would benefit from ST for a tongue-tie. Neurologic history is negative for seizures, stroke, syncope, and significant head trauma. Sleep disturbance is significant for snoring. There has been no previous sleep study. Pain complaints include occasional headaches. Alcohol use is rare. There is no tobacco or illicit substance use. Family history of neurologic illness was denied. With regard to emotional functioning, Ms. Rankin denied a history of psychiatric illness, psychiatric hospitalization, suicidal ideation, self-harm behaviors, psychotic symptoms, and psychological trauma. Socially, Ms. Judit Myers has never been  and has no children. She lives with her sister. She has never had a group of friends but does desire increased social support. Family support was described as good. Mr. Susan Gavin does not exercise regularly. Hobbies include tennis, basketball, and bowling as well as listening to music. Academically, Ms. Rankin completed her bachelor's degree at [employer] in the area of athletic training. She has no history of LD. Vocationally, Ms. Judit Myers has been employed for more than 10 years at [employer] as an . Functionally, Ms. Judit Myers remains independent for ADL and IADL care.   She continues to drive without incident. MENTAL STATUS:    Sensorium  Awake, Aware, Alert   Orientation person, place, time/date, situation, day of week, month of year and year   Relations cooperative   Eye Contact appropriate   Appearance:  age appropriate   Motor Behavior:  within normal limits   Speech:  monotone   Vocabulary average   Thought Process: within normal limits   Thought Content free of delusions and free of hallucinations   Suicidal ideations none   Homicidal ideations none   Mood:  euthymic   Affect:  constricted   Memory recent  adequate   Memory remote:  adequate   Concentration:  adequate   Abstraction:  abstract   Insight:  fair   Reliability fair   Judgment:  fair       METHODS OF ASSESSMENT (Current Evaluation):  Clinician Administered:  Hernandez Anxiety Scale (CHARLOTTE)  Hernandez Depression Scale-II (BDI-II)  Detailed Assessment of Posttraumatic Stress (DAPS)  Personality Assessment Inventory (BULL-2)  Social Responsiveness Scale, Second Edition (SRS-2)    Technician Administered (Janette Garcia):  ACS-Social Cognition   Yani Ramos Developmental Test of Visual-Motor Integration-Sixth Edition  Category Fluency for Animals  Controlled Oral Word Association Test  FATEMEH-2 Continuous Performance Test  Neuropsychological Assessment Battery-Select Subtests  Reliable Digit Span  Trailmaking Test  Wechsler Adult Intelligence Scale-IV  Wisconsin Card Sorting Test    TEST OBSERVATIONS:  Ms. Nida Lopez arrived promptly for the testing session. Dress and grooming were appropriate; physical presentation was unchanged from that observed during the clinical interview. Speech was fluent and coherent with normal rate, rhythm, tone, and volume. No expressive or receptive language deficits were noted. No unusual behaviors or psychomotor abnormalities were observed. Thought process was logical, relevant, and focused. Thought content showed no apparent delusional ideation.  Auditory and visual hallucinations were denied and there was no obvious response to internal stimuli. Affect was congruent with the euthymic mood conveyed. Ms. Blanca Daigle was adequately cooperative and appeared to put forth good effort throughout this examination. Rapport with the examiner was adequately established and maintained. Minimal prompting was required. Comprehension of test instructions was not problematic. Performance motivation was objectively measured with one instrument (Reliable Digit Span); Ms. Blanca Daigle produced a normal score on this measure. Accordingly, test findings below do not appear to be the product of disingenuous effort. Given the above observations, plus comments contained in the Mental Status section, the results of this examination are regarded as reasonably reliable and valid. TEST RESULTS:  Quantitative test results are derived from comparisons to age and education corrected cohort normative data, where applicable. Percentiles are included in these instances. Qualitative test results are determined using clinical observations. General Orientation and Awareness:       Orientation to person yes   Time yes  Place yes  Circumstance yes                     Sensory-Perceptual and Motor Functioning:    Visual and auditory acuity:  Glasses      Gait and balance:   Ambulated independently                                         Classification:    Attention/Concentration:       Simple visuomotor tracking (58 percentile)                  Average    On a continuous performance test, difficulties were noted in the following areas: Auditory and visual response control and visual sustained attention.     Visuospatial and Constructional Praxis:     Visual-Motor Integration (19 percentile)        Low Average    Language:         Phonemic verbal fluency (18 percentile)                                 Low Average   Categorical verbal fluency (35 percentile)                  Average    Memory and Learning:       Word list immediate recall (8 percentile) Mildly Impaired  Word list short delayed recall (16 percentile)                  Low Average  Word list long delayed recall (54 percentile)                             Average  Forced Choice Recognition (14 percentile)       Low Average  Shape learning immediate recognition (58 percentile)       Average    Shape learning delayed recognition (79 percentile)                 Above Average  Forced Choice Recognition (25 percentile)        Average  Story learning immediate recall (31 percentile)        Average  Story learning delayed recall (14 percentile)                             Low Average    Cognitive Tests of Executive Functioning:     Ability to think flexibly, Trailmaking B (73 percentile)                 Average  Conceptual problem solving                                                                Categories Completed (>16 percentile)         Average        Trials to Complete First Category (11-16 percentile)                 Low Average        Failure to Maintain Set (11-16 percentile)        Low Average   Learning to Learn (>16 percentile)        Average    Intellectual and Basic Cognitive Functioning (WAIS-IV)  Verbal Comprehension Index: 105 Percentile: 63      Average   Similarities: 10    Percentile: 50      Vocabulary: 11    Percentile: 63           Information: 12   Percentile: 75     Perceptual Reasoning Index: 102  Percentile: 55      Average   Block Design: 6   Percentile: 9      Matrix Reasonin   Percentile: 91           Visual Puzzles: 11   Percentile: 63     Working Memory Index: 100  Percentile: 50      Average   Digit Span: 10    Percentile: 50      Arithmetic: 10    Percentile: 50     Processing Speed: 92   Percentile: 30      Average   Symbol Search: 11   Percentile: 63      Codin    Percentile: 9     Full Scale IQ: 100    Percentile: 50      Average         Social Cognition (ACS):        Social Perception        Scaled: 11 Percentile: 63   Average         SP Affect Naming        Scaled: 11 Percentile: 63   Average        SP Prosody          Scaled: 12 Percentile: 75   Average        SP Pairs          Scaled: 12 Percentile: 75   Average    On the social responsiveness scale, Ms. Abbe Jansen reported difficulties with social motivation. Overall, the profile was not consistent with Autism Spectrum Disorder. Emotional Functioning:  Screening of Emotional/Psychiatric Status:  Level of self-reported anxiety    (5/63)     Minimal  Level of self-reported depression   (5/63)     Minimal    On a measure of symptomatic responding to a specific traumatic event, Ms. Abbe Jansen denied experiencing any of the traumas assessed by the measure. On a measure of general emotional functioning, there was evidence of positive and negative impression management. That said, the profile was entirely within normal limits. Interpersonally, Ms. Rankin characterized herself as cold and unfeeling. IMPRESSIONS/RECOMMENDATIONS:  Test performance was broadly Average overall though punctuated with weaknesses in the following areas: visual and auditory response control, visual sustained attention, and list learning. Though this does not provide overwhelming evidence for ADHD, there does appear to be sufficient evidence. Pharmacotherapy may be beneficial and is deferred to the referring provider. Of note, I considered a diagnosis of Autism Spectrum Disorder but did not find sufficient evidence for this diagnosis. Clinical levels of anxiety and depression were also denied. However, positive impression management was noted on self-report measures and therefore, Ms. Abbe Jansen may have under-reported symptoms related to her mood. A sleep study is suggested due to the snoring and headaches reported. In fact, it might be worth exploring the presence of sleep apnea prior to treating ADHD with pharmacotherapy as untreated JASON can be associated with cognitive symptoms.      Ms. Abbe Jansen is encouraged to consider the general recommendations below. DIAGNOSTIC IMPRESSIONS:  ADHD, mild  Anxiety about health     GENERAL RECOMMENDATIONS:   Exercise: Exercise can improve your thinking and ability to focus. Activities such as gardening, caring for pets, or walking, can help improve your attention and concentration levels. Meditation: Meditation can help improve brain function by increasing your focus and awareness. Day-to-day coping  Use a detailed daily planner, notebooks, reminder notes, or your smart phone. John Hilt Keeping everything in one place makes it easier to find the reminders you may need. You might want to keep track of appointments and schedules, to do lists, important dates, websites, phone numbers and addresses, meeting notes, and even movies youd like to see or books youd like to read. Do the most demanding tasks at the time of they day when you feel your energy levels are the highest.  Exercise your brain. Take a class, do word puzzles, or learn a new language. Get enough rest and sleep. Keep moving. Regular physical activity is not only good for your body, but also improves your mood, makes you feel more alert, and decreases tiredness (fatigue). Eat veggies. Studies have shown that eating more vegetables is linked to keeping brain power as people age. Set up and follow routines. Try to keep the same daily schedule. Pick a certain place for commonly lost objects (like keys) and put them there each time. Try not to multi-task. Focus on one thing at a time. Avoid alcohol and other agents that might change your mental state and sleeping patterns  Ask for help when you need it. Friends and loved ones can help with daily tasks to cut down on distractions and help you save mental energy. Track your memory problems. Keep a diary of when you notice problems and whats going on at the time. Medicines taken, time of day, and the situation youre in might help you figure out what affects your memory.  Keeping track of when the problems are most noticeable can also help you prepare. Rosy Martines know to avoid planning important conversations or appointments during those times. This record will also be useful when you talk with your doctor about these problems. Thank you for allowing me the opportunity to assist you in Ms. Rankin's care. Please do not hesitate to contact me should you have additional questions that I may not have addressed. 96136 x 1  96137 x 1  96138 x 1  96139 x 4  96130 x 1  96131 x 1    [facility] Naida Muse, Ph.D.   Licensed Clinical Psychologist        Time Documentation:     77868 x 1   19885 x 1  Neuropsych testing/data gathering by Neuropsychologist: (1 hour), 96136 x 1 (30 minutes), 96137 x 1 (additional 30 minutes)     96138 x 1  96139 x 4 Test Administration/Data Gathering By Technician: (2.5 hours). 31846 x 1 (first 30 minutes), 96139 x 4 (each additional 30 minutes)     96130 x 1  96131 x 1 Testing Evaluation Services by Neuropsychologist (1 hour, 50 minutes) 96130 x 1 (first hour), 96131 x 1 (50 minutes)     The above includes: Record review. Review of history provided by patient. Review of collaborative information. Testing by Clinician. Review of raw data. Scoring. Report writing of individual tests administered by Clinician. Integration of individual tests administered by psychometrist with NSE/testing by clinician, review of records/history/collaborative information, case Conceptualization, treatment planning, clinical decision making, report writing, coordination Of Care. Psychometry test codes as time spent by psychometrist administering and scoring neurocognitive/psychological tests under supervision of neuropsychologist.  Integral services including scoring of raw data, data interpretation, case conceptualization, report writing etcetera were initiated after the patient finished testing/raw data collected and was completed on the date the report was signed.     This note will not be viewable in [address] This note was created using voice recognition software. Despite editing, there may be syntax errors.

## 2022-09-26 ENCOUNTER — OFFICE VISIT (OUTPATIENT)
Dept: NEUROLOGY | Age: 37
End: 2022-09-26
Payer: COMMERCIAL

## 2022-09-26 DIAGNOSIS — F90.9 ATTENTION DEFICIT HYPERACTIVITY DISORDER (ADHD), UNSPECIFIED ADHD TYPE: Primary | ICD-10-CM

## 2022-09-26 DIAGNOSIS — F41.8 ANXIETY ABOUT HEALTH: ICD-10-CM

## 2022-09-26 PROCEDURE — 90832 PSYTX W PT 30 MINUTES: CPT | Performed by: PSYCHOLOGIST

## 2022-09-26 NOTE — PROGRESS NOTES
Interactive Psychotherapy/office feedback        Interactive office feedback session with Ms. Rankin. I reviewed the results of the recent Neuropsychological Evaluation  including the observed areas of neurocognitive strengths and weaknesses. Education was provided regarding my diagnostic impressions, and treatment plan/options were discussed. I also answered numerous questions related to the clinical findings, including the various methods to improve cognition and mood. CBT, psychoeducation, and supportive psychotherapy techniques were utilized. Prior to seeing the patient I reviewed the records, including the previously completed report, the records in [place], and any updated visits from other providers since I saw the patient last.       Diagnoses:      ADHD, mild  Anxiety about health     The patient will follow up with the referring provider, and reported being very pleased with the services provided. [facility] [address] psychotherapy 30 Minutes      This note was created using voice recognition software. Despite editing, there may be syntax errors.

## 2022-12-02 ENCOUNTER — OFFICE VISIT (OUTPATIENT)
Dept: INTERNAL MEDICINE CLINIC | Age: 37
End: 2022-12-02
Payer: COMMERCIAL

## 2022-12-02 VITALS
WEIGHT: 266.2 LBS | HEART RATE: 91 BPM | OXYGEN SATURATION: 99 % | DIASTOLIC BLOOD PRESSURE: 89 MMHG | BODY MASS INDEX: 48.99 KG/M2 | TEMPERATURE: 98.1 F | RESPIRATION RATE: 18 BRPM | SYSTOLIC BLOOD PRESSURE: 126 MMHG | HEIGHT: 62 IN

## 2022-12-02 DIAGNOSIS — E78.2 MIXED HYPERLIPIDEMIA: ICD-10-CM

## 2022-12-02 DIAGNOSIS — E66.01 OBESITY, CLASS III, BMI 40-49.9 (MORBID OBESITY) (HCC): ICD-10-CM

## 2022-12-02 DIAGNOSIS — D75.839 THROMBOCYTOSIS: ICD-10-CM

## 2022-12-02 DIAGNOSIS — F90.9 ATTENTION DEFICIT HYPERACTIVITY DISORDER (ADHD), UNSPECIFIED ADHD TYPE: Primary | ICD-10-CM

## 2022-12-02 RX ORDER — KETOROLAC TROMETHAMINE 5 MG/ML
SOLUTION OPHTHALMIC
COMMUNITY
Start: 2022-11-03

## 2022-12-02 RX ORDER — ATOMOXETINE 40 MG/1
40 CAPSULE ORAL DAILY
Qty: 30 CAPSULE | Refills: 5 | Status: SHIPPED | OUTPATIENT
Start: 2022-12-02

## 2022-12-02 NOTE — PROGRESS NOTES
INTERNISTS OF [facility]:  12/2/2022, MRN: [id number]      Fiorella Alfonso is a 42 y.o. female and presents to clinic for Medication Refill      Subjective:   She is a 42yo female with h/o thrombocytosis (followed by [facility]), previous positive PPD, Covid-19,  Glaucoma (followed by ), microscopic hematuria (evaluated by [facility]), asthma, ADHD, allergic rhinitis, HLD, and obesity. 1. Obesity/HLD: BMI is 48. She took Contrave x 3 wks but stopped it after she did not notice a difference in her appetite with taking this rx. No adverse side effects. She is on a cholesterol-lowering medication. Her last LDL was 120 in May. 2. ADHD: Status post a neuropsychological assessment earlier this year. She does not have mild ADHD and anxiety regarding her health. She disagrees with the findings of anxiety but truly believes she could have mild ADHD. She is agreeable to try medication today. She wants to avoid stimulant-based medications. Note that earlier this year, she reported difficulty focusing at work. She denies depression symptoms. She works as an  at [employer].    Neuropsychological Assessment 8/24/22:   Diagnoses:      ADHD, mild  Anxiety about health      3. Thrombocytosis: Prior lab work in May. She sees [facility]. Asymptomatic.            Patient Active Problem List    Diagnosis Date Noted    Thrombocytosis 04/07/2022    Microscopic hematuria 04/07/2022    Attention deficit hyperactivity disorder (ADHD) 04/07/2022    Glaucoma 04/07/2022    Asthma     Mixed hyperlipidemia     Hirsutism        Current Outpatient Medications   Medication Sig Dispense Refill    Combigan 0.2-0.5 % drop ophthalmic solution       ketorolac (ACULAR) 0.5 % ophthalmic solution INSTILL 1 DROP TWICE DAILY INTO OPERATIVE EYE FOR 5 DAYS STARTING DAY OF LASER      naltrexone-buPROPion (Contrave) 8-90 mg TbER ER tablet First Month: Contrave 8mg/90mg #70  Week 1 : 1 Tab AM  Week 2 : 1 Tab AM, 1 Tab PM  Week 3 : 2 Tab AM, 1 Tab PM  Week 4 : 2 Tab AM, 2 Tab PM  Week 5 and Beyond: Contrave 8mg/90mg #120   2 Tab AM, 2 Tab  Tablet 3    albuterol (PROVENTIL HFA, VENTOLIN HFA, PROAIR HFA) 90 mcg/actuation inhaler Take 2 Puffs by inhalation every six (6) hours as needed for Wheezing. (Patient not taking: No sig reported) 1 Each 5       No Known Allergies    Past Medical History:   Diagnosis Date    Arthritis 12/6/13    R ankle    Asthma     Hirsutism     Microhematuria     Mixed hyperlipidemia     Positive PPD 2006    rx'd with [facility]       Past Surgical History:   Procedure Laterality Date    HX BREAST REDUCTION  2011    HX HEENT  2011    Nasal Turbinate    HX ORTHOPAEDIC Right 2014    Ankle     HX SEPTOPLASTY  2012       Family History   Problem Relation Age of Onset    Diabetes Mother     Hypertension Mother     Elevated Lipids Mother     Kidney Disease Mother     Elevated Lipids Father        Social History     Tobacco Use    Smoking status: Never    Smokeless tobacco: Never   Substance Use Topics    Alcohol use: Not Currently     Alcohol/week: 0.0 standard drinks     Comment: occasional ETOH       ROS   Review of Systems   Constitutional:  Negative for chills and fever. HENT:  Negative for ear pain and sore throat. Eyes:  Negative for blurred vision and pain. Respiratory:  Negative for cough and shortness of breath. Cardiovascular:  Negative for chest pain. Gastrointestinal:  Negative for abdominal pain, blood in stool and melena. Genitourinary:  Negative for dysuria and hematuria. Musculoskeletal:  Negative for joint pain and myalgias. Skin:  Negative for rash. Neurological:  Negative for headaches. Endo/Heme/Allergies:  Does not bruise/bleed easily. Psychiatric/Behavioral:  Negative for substance abuse.       Objective     Vitals:    12/02/22 1430   BP: 126/89   Pulse: 91   Resp: 18   Temp: 98.1 °F (36.7 °C)   TempSrc: Temporal   SpO2: 99%   Weight: 266 lb 3.2 oz (120.7 kg)   Height: 5' 2\" (1.575 m)   PainSc:   0 - No pain       Physical Exam  Vitals and nursing note reviewed. HENT:      Head: Normocephalic and atraumatic. Right Ear: External ear normal.      Left Ear: External ear normal.   Eyes:      General: No scleral icterus. Right eye: No discharge. Left eye: No discharge. Conjunctiva/sclera: Conjunctivae normal.   Cardiovascular:      Rate and Rhythm: Normal rate and regular rhythm. Heart sounds: Normal heart sounds. No murmur heard. No friction rub. No gallop. Pulmonary:      Effort: Pulmonary effort is normal. No respiratory distress. Breath sounds: Normal breath sounds. No wheezing or rales. Chest:      Chest wall: No tenderness. Abdominal:      General: Bowel sounds are normal. There is no distension. Palpations: Abdomen is soft. Tenderness: There is no guarding or rebound. Musculoskeletal:         General: No swelling (BUE) or tenderness (BUE). Cervical back: Neck supple. Lymphadenopathy:      Cervical: No cervical adenopathy. Skin:     General: Skin is warm and dry. Findings: No erythema or rash. Neurological:      Mental Status: She is alert. Motor: No abnormal muscle tone.       Gait: Gait normal.   Psychiatric:         Mood and Affect: Mood normal.       LABS   Data Review:   Lab Results   Component Value Date/Time    WBC 5.6 05/28/2022 10:18 AM    HGB 12.3 05/28/2022 10:18 AM    HCT 37.1 05/28/2022 10:18 AM    PLATELET 501 (H) 81/68/4724 10:18 AM    MCV 84.9 05/28/2022 10:18 AM       Lab Results   Component Value Date/Time    Sodium 138 05/28/2022 10:18 AM    Potassium 4.3 05/28/2022 10:18 AM    Chloride 106 05/28/2022 10:18 AM    CO2 27 05/28/2022 10:18 AM    Anion gap 5 05/28/2022 10:18 AM    Glucose 103 (H) 05/28/2022 10:18 AM    BUN 15 05/28/2022 10:18 AM    Creatinine 0.83 05/28/2022 10:18 AM    BUN/Creatinine ratio 18 05/28/2022 10:18 AM    GFR est AA >60 05/28/2022 10:18 AM    GFR est non-AA >60 05/28/2022 10:18 AM    Calcium 8.8 05/28/2022 10:18 AM       Lab Results   Component Value Date/Time    Cholesterol, total 209 (H) 05/28/2022 10:18 AM    HDL Cholesterol 77 (H) 05/28/2022 10:18 AM    LDL, calculated 120.2 (H) 05/28/2022 10:18 AM    VLDL, calculated 11.8 05/28/2022 10:18 AM    Triglyceride 59 05/28/2022 10:18 AM    CHOL/HDL Ratio 2.7 05/28/2022 10:18 AM       Lab Results   Component Value Date/Time    Hemoglobin A1c 5.5 05/28/2022 10:18 AM       Assessment/Plan:   1. Attention deficit hyperactivity disorder (ADHD): New diagnosis  -Starting Strattera. - She will let me know if she has any adverse side effects or if she does not notice any difference in taking this medication after 4 weeks. At that time, I would adjust her medication. ORDERS:  - atomoxetine (STRATTERA) 40 mg capsule; Take 1 Capsule by mouth daily. Dispense: 30 Capsule; Refill: 5    2. Thrombocytosis: Asymptomatic.  - I will check labs in 6 months. 3.  Obesity/HLD: Her last LDL in May was 120.  -Checking labs in 6 months. - We discussed the option of adding a different medication (GLP-1 agonist) to assist with appetite suppression and weight reduction. We discussed having her see the nonsurgical weight loss program.  We also discussed going to a gym to increase her exercise/activity level (she already has a no-show) and trying a diet that does not require that she go through her insurance (weight watchers, Mediterranean diet, ect). She will let me know if she wants to pursue any of these options through her medical insurance. She declines at this time. ICD-10-CM ICD-9-CM    1. Attention deficit hyperactivity disorder (ADHD), unspecified ADHD type  F90.9 314.01 atomoxetine (STRATTERA) 40 mg capsule      2. Thrombocytosis  P14.632 812.57 METABOLIC PANEL, COMPREHENSIVE      CBC WITH AUTOMATED DIFF      3.  Obesity, Class III, BMI 40-49.9 (morbid obesity) (HCC)  E66.01 278.01 HEMOGLOBIN A1C WITH EAG      LIPID PANEL      METABOLIC PANEL, COMPREHENSIVE      4. Mixed hyperlipidemia  E78.2 272.2 HEMOGLOBIN A1C WITH EAG      LIPID PANEL      METABOLIC PANEL, COMPREHENSIVE          Health Maintenance Due   Topic Date Due    Hepatitis C Screening  Never done    COVID-19 Vaccine (1) Never done    Flu Vaccine (1) 08/01/2022     Lab review: labs are reviewed in the EHR and ordered as mentioned above. I have discussed the diagnosis with the patient and the intended plan as seen in the above orders. The patient has received an after-visit summary and questions were answered concerning future plans. I have discussed medication side effects and warnings with the patient as well. I have reviewed the plan of care with the patient, accepted their input and they are in agreement with the treatment goals. All questions were answered. The patient understands the plan of care. Handouts provided today with above information. Pt instructed if symptoms worsen to call the office or report to the ED for continued care. Greater than 50% of the visit time was spent in counseling and/or coordination of care. Voice recognition was used to generate this report, which may have resulted in some phonetic based errors in grammar and contents. Even though attempts were made to correct all the mistakes, some may have been missed, and remained in the body of the document.           Ritesh Cannon MD

## 2022-12-02 NOTE — PROGRESS NOTES
Chief Complaint   Patient presents with    Medication Refill         1. \"Have you been to the ER, urgent care clinic since your last visit? Hospitalized since your last visit? \" No    2. \"Have you seen or consulted any other health care providers outside of the [address] [place] [address] since your last visit? \" No    3. For patients aged 39-70: Has the patient had a colonoscopy? Na , based on age or sex    If the patient is female:    3. For patients aged 41-77: Has the patient had a mammogram within the past 2 years? Na , based on age or sex    11. For patients aged 21-65: Has the patient had a pap smear?  Yes, no care gap present

## 2023-01-11 ENCOUNTER — OFFICE VISIT (OUTPATIENT)
Dept: SURGERY | Age: 38
End: 2023-01-11
Payer: COMMERCIAL

## 2023-01-11 VITALS
HEART RATE: 116 BPM | DIASTOLIC BLOOD PRESSURE: 79 MMHG | TEMPERATURE: 97.2 F | OXYGEN SATURATION: 99 % | HEIGHT: 62 IN | WEIGHT: 265 LBS | SYSTOLIC BLOOD PRESSURE: 126 MMHG | BODY MASS INDEX: 48.76 KG/M2 | RESPIRATION RATE: 17 BRPM

## 2023-01-11 DIAGNOSIS — E66.01 MORBID OBESITY (HCC): Primary | ICD-10-CM

## 2023-01-11 DIAGNOSIS — Z72.4 INAPPROPRIATE DIET OR EATING HABITS: ICD-10-CM

## 2023-01-11 DIAGNOSIS — Z71.3 ENCOUNTER FOR WEIGHT LOSS COUNSELING: ICD-10-CM

## 2023-01-11 PROCEDURE — 99204 OFFICE O/P NEW MOD 45 MIN: CPT | Performed by: NURSE PRACTITIONER

## 2023-01-11 NOTE — PROGRESS NOTES
Weight Loss Progress Note: Initial Physician Visit      Oanh Abreu is a 42 y.o. female with BMI 48 who is here for her Initial Evaluation for the medical weight loss LCD program. Patient has not previously done a [facility] program, but has attempted ot lose weight on her own through dieting and exercise and lost approximately 25lbs. She wants to lose weight to be healthier because she recognizes that she lives a very sedentary lifestyle. CC: Morbid Obesity    Weight History  Current weight 265lbs  Goal weight 160lbs   Highest weight 268lbs   (See weight gain time line scanned into media section of chart)      Food intolerances (gas, bloating, diarrhea, vomiting)? Milk, ice cream.     Weight loss History  How many weight loss attempts have you had? A few attempts. Which program were you most successful doing? Diet and exercising - lost 20-25lb about 8 years ago. Significant Medical History    Have you ever taken appetite suppressants? Yes. If yes: Rx or OTC? Contrave   If yes; Any negative side effects? Ever diagnosed with sleep apnea or put on CPAP? No.    Ever had bariatric surgery?: No.    Pregnant or planning on becoming pregnant w/in 6 months: No.        Significant Psychosocial History   Any history of drug abuse or dependence: No.  Current Major Lifestyle Changes: N/A  Any potential unsupportive people: No.  Why are you starting a weight loss program now? Patient states she desires a healthier lifestyle. Are you ready? History of binge eating disorder or anorexia: No.  If yes, are you currently being treated? Social History  Social History     Tobacco Use    Smoking status: Never    Smokeless tobacco: Never   Substance Use Topics    Alcohol use: Yes     Comment: Rarely     How many times a week do you eat out? 5 times weekly. Do you drink any EtOH? Yes. If so, how much? Rarely    Exercise  How many days a week do you currently exercise?  0 days.   Have you ever been told by a physician not to exercise? No      Objective  Visit Vitals  /79 (BP Patient Position: Sitting)   Pulse (!) 116   Temp 97.2 °F (36.2 °C) (Temporal)   Resp 17   Ht 5' 2\" (1.575 m)   Wt 265 lb (120.2 kg)   SpO2 99%   BMI 48.47 kg/m²       Waist Circumference: 46in  Arm Circumference: 16in  Thigh Circumference: 25in  Percent Body Fat: 47.3%  Weight Metrics 1/11/2023 12/2/2022 6/7/2022 3/30/2022 2/18/2020 3/23/2018 10/18/2017   Weight 265 lb 266 lb 3.2 oz 264 lb 264 lb 265 lb 250 lb 246 lb   BMI 48.47 kg/m2 48.69 kg/m2 48.29 kg/m2 48.29 kg/m2 48.47 kg/m2 45.73 kg/m2 44.99 kg/m2         Labs: None    Review of Systems  Review of Systems   Constitutional: Negative. HENT: Negative. Eyes: Negative. Respiratory: Negative. Cardiovascular: Negative. Gastrointestinal: Negative. Genitourinary: Negative. Musculoskeletal: Negative. Skin: Negative. Neurological: Negative. Endo/Heme/Allergies: Negative. Psychiatric/Behavioral: Negative. Physical Exam    Physical Exam  Constitutional:       Appearance: She is obese. HENT:      Head: Normocephalic and atraumatic. Mouth/Throat:      Mouth: Mucous membranes are moist.   Eyes:      Extraocular Movements: Extraocular movements intact. Pupils: Pupils are equal, round, and reactive to light. Cardiovascular:      Rate and Rhythm: Normal rate and regular rhythm. Pulses: Normal pulses. Heart sounds: Normal heart sounds. Pulmonary:      Effort: Pulmonary effort is normal.      Breath sounds: Normal breath sounds. Abdominal:      Palpations: Abdomen is soft. Musculoskeletal:         General: Normal range of motion. Cervical back: Normal range of motion and neck supple. Skin:     General: Skin is warm and dry. Neurological:      General: No focal deficit present. Mental Status: She is alert and oriented to person, place, and time.    Psychiatric:         Mood and Affect: Mood normal.         Behavior: Behavior normal.        Assessment & Plan  Based on his history and exam, Yanique Quintero is a good candidate for the Non-[facility] Weight Loss LCD Program. She has not previously done a [facility] program, but is motivated to lose the weight to be healthier. Patient has a flat affect so unsure if that coupled with her fairly recent ADHD diagnosis will equate to success on this program, but I remain cautiously optimistic. She has previously seen a nutritionist in July 2022 who based on notes review advised patient of the exact same nutritional counseling since her dietary choices reflect that she is essentially a carb-o-holic. With the bulk of her lunch and dinner being take out meals such as [employer], or [employer]. In addition patient endorses that her food weaknesses are sweets and salty foods that she consumes throughout the day. Patient endorses that she consumes approximately 64oz of water daily, but she does not have a regular exercise regimen. Discussed with patient that LCD program would be a good fit for her as it will eliminate her consumption of carbohydrates and focus more on protein and vitamins. Encouraged patient to continue with her water consumption and to purchase the protein variety pack and utilize those for snacks in lieu of the chocolate, pretzels, or candy bars that she would typically choose. Advised patient to count her steps while at work and to target between 6-10k daily in addition to walking for 30mins at least 3x weekly. Diet Plan: LCD      Activity Plan: Walking 30mins 3x weekly, 6-10k steps daily    Medication Plan: None      There are no Patient Instructions on file for this visit. There were no encounter diagnoses.           QUINTIN Santizo

## 2023-02-04 DIAGNOSIS — E66.01 OBESITY, CLASS III, BMI 40-49.9 (MORBID OBESITY) (HCC): Primary | ICD-10-CM

## 2023-02-04 DIAGNOSIS — E78.2 MIXED HYPERLIPIDEMIA: ICD-10-CM

## 2023-02-05 DIAGNOSIS — D75.839 THROMBOCYTOSIS: Primary | ICD-10-CM

## 2023-02-05 DIAGNOSIS — E66.01 OBESITY, CLASS III, BMI 40-49.9 (MORBID OBESITY) (HCC): ICD-10-CM

## 2023-02-05 DIAGNOSIS — E78.2 MIXED HYPERLIPIDEMIA: ICD-10-CM

## 2023-06-02 ENCOUNTER — HOSPITAL ENCOUNTER (OUTPATIENT)
Facility: HOSPITAL | Age: 38
Discharge: HOME | End: 2023-06-05

## 2023-06-02 DIAGNOSIS — D75.839 THROMBOCYTOSIS: ICD-10-CM

## 2023-06-02 DIAGNOSIS — E66.01 OBESITY, CLASS III, BMI 40-49.9 (MORBID OBESITY) (HCC): ICD-10-CM

## 2023-06-02 DIAGNOSIS — E78.2 MIXED HYPERLIPIDEMIA: ICD-10-CM

## 2023-06-02 LAB — SENTARA SPECIMEN COLLECTION: NORMAL

## 2023-06-03 LAB
A/G RATIO: 1.4 RATIO (ref 1.1–2.6)
ALBUMIN SERPL-MCNC: 4.5 G/DL (ref 3.5–5)
ALP BLD-CCNC: 52 U/L (ref 25–115)
ALT SERPL-CCNC: 18 U/L (ref 5–40)
ANION GAP SERPL CALCULATED.3IONS-SCNC: 11 MMOL/L (ref 3–15)
AST SERPL-CCNC: 15 U/L (ref 10–37)
AVERAGE GLUCOSE: 119 MG/DL (ref 91–123)
BASOPHILS # BLD: 1 % (ref 0–2)
BASOPHILS ABSOLUTE: 0 K/UL (ref 0–0.2)
BILIRUB SERPL-MCNC: 0.2 MG/DL (ref 0.2–1.2)
BUN BLDV-MCNC: 14 MG/DL (ref 6–22)
CALCIUM SERPL-MCNC: 10 MG/DL (ref 8.4–10.5)
CHLORIDE BLD-SCNC: 101 MMOL/L (ref 98–110)
CHOLESTEROL/HDL RATIO: 3.5 (ref 0–5)
CHOLESTEROL: 243 MG/DL (ref 110–200)
CO2: 26 MMOL/L (ref 20–32)
CREAT SERPL-MCNC: 0.8 MG/DL (ref 0.5–1.2)
EOSINOPHIL # BLD: 2 % (ref 0–6)
EOSINOPHILS ABSOLUTE: 0.1 K/UL (ref 0–0.5)
GLOBULIN: 3.3 G/DL (ref 2–4)
GLOMERULAR FILTRATION RATE: >60 ML/MIN/1.73 SQ.M.
GLUCOSE: 100 MG/DL (ref 70–99)
HBA1C MFR BLD: 5.8 % (ref 4.8–5.6)
HCT VFR BLD CALC: 44.8 % (ref 35.1–46.5)
HDLC SERPL-MCNC: 70 MG/DL
HEMOGLOBIN: 13.9 G/DL (ref 11.7–15.5)
LDL CHOLESTEROL CALCULATED: 161 MG/DL (ref 50–99)
LDL/HDL RATIO: 2.3
LYMPHOCYTES # BLD: 41 % (ref 20–45)
LYMPHOCYTES ABSOLUTE: 1.9 K/UL (ref 1–4.8)
MCH RBC QN AUTO: 28 PG (ref 26–34)
MCHC RBC AUTO-ENTMCNC: 31 G/DL (ref 31–36)
MCV RBC AUTO: 90 FL (ref 80–99)
MONOCYTES ABSOLUTE: 0.5 K/UL (ref 0.1–1)
MONOCYTES: 12 % (ref 3–12)
NEUTROPHILS ABSOLUTE: 2.1 K/UL (ref 1.8–7.7)
NEUTROPHILS: 45 % (ref 40–75)
NON-HDL CHOLESTEROL: 173 MG/DL
PDW BLD-RTO: 13.2 % (ref 10–15.5)
PLATELET # BLD: 528 K/UL (ref 140–440)
PMV BLD AUTO: 10.1 FL (ref 9–13)
POTASSIUM SERPL-SCNC: 4.4 MMOL/L (ref 3.5–5.5)
RBC: 4.98 M/UL (ref 3.8–5.2)
SODIUM BLD-SCNC: 138 MMOL/L (ref 133–145)
TOTAL PROTEIN: 7.8 G/DL (ref 6.4–8.3)
TRIGL SERPL-MCNC: 59 MG/DL (ref 40–149)
VLDLC SERPL CALC-MCNC: 12 MG/DL (ref 8–30)
WBC: 4.6 K/UL (ref 4–11)

## 2023-06-14 PROBLEM — R73.03 PREDIABETES: Status: ACTIVE | Noted: 2023-06-14

## 2023-07-05 DIAGNOSIS — R73.03 PREDIABETES: ICD-10-CM

## 2023-07-06 RX ORDER — ORAL SEMAGLUTIDE 7 MG/1
7 TABLET ORAL DAILY
Qty: 30 TABLET | Refills: 5 | Status: SHIPPED | OUTPATIENT
Start: 2023-07-06

## 2023-07-06 RX ORDER — ORAL SEMAGLUTIDE 3 MG/1
3 TABLET ORAL DAILY
Qty: 30 TABLET | Refills: 0 | Status: CANCELLED | OUTPATIENT
Start: 2023-07-06

## 2023-07-06 NOTE — TELEPHONE ENCOUNTER
Semaglutide (RYBELSUS) 3 MG TABS [Dr. Yue Siegel MD]       Patient Comment: Doing well with no side effects. Please increase the dose thank you. Preferred pharmacy: [address] [place] [address] #[id number] - Igor Campuzano, [address] [place] [address]           Please refill if appropriate or refuse medication if not appropriate.     PCP: Yue Siegel MD     Last appt: 6/8/203    Last refill: 6/8/2023      Future Appointments   Date Time Provider [address] [place] [address]   7/17/2023  2:00 PM Ronaldo Palacios DO [facility] BS AMB   8/11/2023  3:00 PM Eva Ayala, APRN - CNP [facility] BS AMB   9/11/2023  2:40 PM Yue Siegel MD [facility] BS AMB         Requested Prescriptions     Pending Prescriptions Disp Refills    Semaglutide (RYBELSUS) 3 MG TABS 30 tablet 0     Sig: Take 3 mg by mouth daily

## 2023-07-17 ENCOUNTER — OFFICE VISIT (OUTPATIENT)
Age: 38
End: 2023-07-17
Payer: COMMERCIAL

## 2023-07-17 VITALS
TEMPERATURE: 98.1 F | RESPIRATION RATE: 18 BRPM | HEIGHT: 62 IN | BODY MASS INDEX: 47.66 KG/M2 | HEART RATE: 98 BPM | DIASTOLIC BLOOD PRESSURE: 80 MMHG | OXYGEN SATURATION: 96 % | SYSTOLIC BLOOD PRESSURE: 134 MMHG | WEIGHT: 259 LBS

## 2023-07-17 DIAGNOSIS — D75.839 THROMBOCYTOSIS: ICD-10-CM

## 2023-07-17 DIAGNOSIS — F90.9 ATTENTION DEFICIT HYPERACTIVITY DISORDER (ADHD), UNSPECIFIED ADHD TYPE: ICD-10-CM

## 2023-07-17 DIAGNOSIS — E66.01 CLASS 3 SEVERE OBESITY DUE TO EXCESS CALORIES WITHOUT SERIOUS COMORBIDITY WITH BODY MASS INDEX (BMI) OF 45.0 TO 49.9 IN ADULT (HCC): ICD-10-CM

## 2023-07-17 DIAGNOSIS — R73.03 PREDIABETES: ICD-10-CM

## 2023-07-17 DIAGNOSIS — F51.12 BEHAVIORALLY INDUCED INSUFFICIENT SLEEP SYNDROME: ICD-10-CM

## 2023-07-17 DIAGNOSIS — R29.818 SUSPECTED SLEEP APNEA: ICD-10-CM

## 2023-07-17 DIAGNOSIS — R06.83 SNORING: Primary | ICD-10-CM

## 2023-07-17 PROCEDURE — 99204 OFFICE O/P NEW MOD 45 MIN: CPT | Performed by: OTOLARYNGOLOGY

## 2023-07-17 ASSESSMENT — SLEEP AND FATIGUE QUESTIONNAIRES
HOW LIKELY ARE YOU TO NOD OFF OR FALL ASLEEP WHILE SITTING INACTIVE IN A PUBLIC PLACE: 0
HOW LIKELY ARE YOU TO NOD OFF OR FALL ASLEEP WHILE SITTING QUIETLY AFTER LUNCH WITHOUT ALCOHOL: 0
ESS TOTAL SCORE: 2
HOW LIKELY ARE YOU TO NOD OFF OR FALL ASLEEP WHILE SITTING AND READING: 0
HOW LIKELY ARE YOU TO NOD OFF OR FALL ASLEEP WHILE SITTING AND TALKING TO SOMEONE: 1
HOW LIKELY ARE YOU TO NOD OFF OR FALL ASLEEP WHILE WATCHING TV: 0
HOW LIKELY ARE YOU TO NOD OFF OR FALL ASLEEP WHILE LYING DOWN TO REST IN THE AFTERNOON WHEN CIRCUMSTANCES PERMIT: 1
HOW LIKELY ARE YOU TO NOD OFF OR FALL ASLEEP WHEN YOU ARE A PASSENGER IN A CAR FOR AN HOUR WITHOUT A BREAK: 0
NECK CIRCUMFERENCE (INCHES): 14
HOW LIKELY ARE YOU TO NOD OFF OR FALL ASLEEP IN A CAR, WHILE STOPPED FOR A FEW MINUTES IN TRAFFIC: 0

## 2023-07-17 ASSESSMENT — PATIENT HEALTH QUESTIONNAIRE - PHQ9
SUM OF ALL RESPONSES TO PHQ QUESTIONS 1-9: 0
SUM OF ALL RESPONSES TO PHQ9 QUESTIONS 1 & 2: 0
2. FEELING DOWN, DEPRESSED OR HOPELESS: 0
SUM OF ALL RESPONSES TO PHQ QUESTIONS 1-9: 0
1. LITTLE INTEREST OR PLEASURE IN DOING THINGS: 0

## 2023-07-17 NOTE — PATIENT INSTRUCTIONS
shipment schedule. Your need to be seen by our office at lat minimum of every 12 months in order to renew the prescription for these supplies. Please make note of who your DME company is and their phone number. Please make sure that you clean your mask and hosing on a regular basis. Your DME can provide you with additional information regarding proper care and cleaning of your device     Safety is strongly encouraged. You should not drive if sleepy, tired, distracted and/or fatigued. Chest Xrays and blood work do not require appointments. They are considered \"Walk-In\" services and can be obtained at either [facility] or [facility].   Blood work is performed at the Laboratory (Lab) from 08:00am - 38:69 pm (if applicable to your visit)

## 2023-07-17 NOTE — PROGRESS NOTES
[facility]  Pulmonary, Critical Care, and Sleep Medicine    Office Progress Note - Initial Evaluation      Primary Care Physician: Julia Khan MD     Reason for Visit: Evaluation for obstructive sleep apnea/sleep disordered breathing    Assessment:  1. Snoring  -     HOME SLEEP STUDY (94044); Future  2. Suspected sleep apnea  -     HOME SLEEP STUDY (32360); Future  3. Prediabetes - managed by [facility] with medications  4. Attention deficit hyperactivity disorder (ADHD), unspecified ADHD type - patient desires not to take medications for this  5. Behaviorally induced insufficient sleep syndrome - Does need to get more sleep during the week  6. Thrombocytosis - Moderate and severe obstructive sleep apnea can be associated with increased platelet function. Tammy Booker is a 78-year-old female with a history of loud snoring and some degree of daytime somnolence. She also has thrombocytosis of unknown cause and this was primarily the reason for referral today. She does states she is sleepy throughout the day but also agrees that during the week she should get a little more sleep. While she snores loudly, she is unsure if she has any witnessed apneas but certainly does not wake up gasping for air. We discussed why it would be important to evaluate for obstructive sleep apnea and particularly since she has some chronic medical disorders (thrombocytosis, type 2 diabetes, ADHD). We discussed the different kinds of sleep studies I think a home sleep apnea test is appropriate in the setting. I will have her follow-up in the office afterwards for discussion. We did briefly discuss CPAP and oral appliances as well as weight loss and not driving sleepy today. Plan: Home Sleep Apnea test and then follow up in office for discussion    Potential consequences of untreated sleep apnea, and/or excessive daytime sleepiness were discussed with the patient.   Educational materials
providers outside of the [address] [place] [address] since your last visit? Include any pap smears or colon screening. no    Medication list has been updated according to patient.

## 2023-07-18 ENCOUNTER — TELEPHONE (OUTPATIENT)
Dept: SLEEP MEDICINE | Facility: HOSPITAL | Age: 38
End: 2023-07-18

## 2023-08-10 RX ORDER — ORAL SEMAGLUTIDE 14 MG/1
14 TABLET ORAL DAILY
Qty: 30 TABLET | Refills: 5 | Status: SHIPPED | OUTPATIENT
Start: 2023-08-10

## 2023-08-10 RX ORDER — ORAL SEMAGLUTIDE 7 MG/1
7 TABLET ORAL DAILY
Qty: 30 TABLET | Refills: 5 | Status: SHIPPED | OUTPATIENT
Start: 2023-08-10 | End: 2023-08-10

## 2023-08-10 NOTE — TELEPHONE ENCOUNTER
Rybelsus 14mg daily ordered in place of 7mg daily.        Dr. Charly Raza  Internists of [address] [place] [address]  Phone: [phone]  Fax: [phone]

## 2023-08-10 NOTE — TELEPHONE ENCOUNTER
PCP: Manuela Kimble MD    Last refill per chart:    Semaglutide (RYBELSUS) 7 MG TABS 7 mg, Oral, DAILY EditCancel Reorder       Summary: Take 7 mg by mouth daily, Disp-30 tablet, R-5  Normal   Dose, Frequency: 7 mg, DAILY  Start: 7/6/2023  Ord/Sold: 7/6/2023 (O)  Report  Taking:   Long-term:   Pharmacy: [id number] - [facility] [place], [address] [place] [address] [phone]  Med Dose History       Patient Sig: Take 7 mg by mouth daily       Ordered on: 7/6/2023       Authorized by: Manuela Kimble       Dispense: 30 tablet       Refills: 5 ordered        Future Appointments   Date Time Provider [address] [place] [address]   8/11/2023  3:00 PM Conception LYNNE Moreno - [facility] BS AMB   8/30/2023  4:00  [facility] STUDY [facility]   9/11/2023  2:40 PM Manuela Kimble MD [facility] BS AMB

## 2023-08-11 ENCOUNTER — PATIENT MESSAGE (OUTPATIENT)
Age: 38
End: 2023-08-11

## 2023-08-11 ENCOUNTER — OFFICE VISIT (OUTPATIENT)
Age: 38
End: 2023-08-11
Payer: COMMERCIAL

## 2023-08-11 VITALS
DIASTOLIC BLOOD PRESSURE: 87 MMHG | BODY MASS INDEX: 46.93 KG/M2 | SYSTOLIC BLOOD PRESSURE: 134 MMHG | TEMPERATURE: 97.7 F | HEIGHT: 62 IN | OXYGEN SATURATION: 99 % | RESPIRATION RATE: 16 BRPM | WEIGHT: 255 LBS | HEART RATE: 100 BPM

## 2023-08-11 DIAGNOSIS — Z71.3 ENCOUNTER FOR DIETARY CONSULTATION: ICD-10-CM

## 2023-08-11 DIAGNOSIS — E66.01 MORBID OBESITY (HCC): Primary | ICD-10-CM

## 2023-08-11 DIAGNOSIS — Z71.3 WEIGHT LOSS COUNSELING, ENCOUNTER FOR: ICD-10-CM

## 2023-08-11 PROCEDURE — 99213 OFFICE O/P EST LOW 20 MIN: CPT | Performed by: NURSE PRACTITIONER

## 2023-08-11 RX ORDER — TAFLUPROST OPTHALMIC 0 MG/.3ML
SOLUTION/ DROPS OPHTHALMIC
COMMUNITY
Start: 2023-07-23

## 2023-08-11 ASSESSMENT — ENCOUNTER SYMPTOMS
ALLERGIC/IMMUNOLOGIC NEGATIVE: 1
GASTROINTESTINAL NEGATIVE: 1
RESPIRATORY NEGATIVE: 1
EYES NEGATIVE: 1

## 2023-08-17 RX ORDER — ORAL SEMAGLUTIDE 14 MG/1
14 TABLET ORAL DAILY
Qty: 30 TABLET | Refills: 5 | Status: SHIPPED | OUTPATIENT
Start: 2023-08-17

## 2023-08-17 NOTE — TELEPHONE ENCOUNTER
From: Ina Hagan  To: Dr. Ferreira Bi2023 3:34 PM EDT  Subject: Different pharmacy     Good afternoon. Is it possible to send the refill to [facility] [address]? [facility] will have to order it.

## 2023-08-30 ENCOUNTER — HOSPITAL ENCOUNTER (OUTPATIENT)
Dept: SLEEP MEDICINE | Facility: HOSPITAL | Age: 38
Discharge: HOME | End: 2023-09-02
Attending: OTOLARYNGOLOGY

## 2023-08-30 DIAGNOSIS — R06.83 SNORING: ICD-10-CM

## 2023-08-30 DIAGNOSIS — R29.818 SUSPECTED SLEEP APNEA: ICD-10-CM

## 2023-09-01 ENCOUNTER — TELEPHONE (OUTPATIENT)
Age: 38
End: 2023-09-01

## 2023-09-01 NOTE — TELEPHONE ENCOUNTER
----- Message from Ashleigh Hagan sent at 9/1/2023  9:25 AM EDT -----  Regarding: Blood work  Contact: [phone]  Good morning Dr. Maxine Neumann, will I need bloodwork done for the appointment on 9/11?

## 2023-09-11 ENCOUNTER — OFFICE VISIT (OUTPATIENT)
Age: 38
End: 2023-09-11
Payer: COMMERCIAL

## 2023-09-11 VITALS
RESPIRATION RATE: 18 BRPM | WEIGHT: 252 LBS | SYSTOLIC BLOOD PRESSURE: 112 MMHG | OXYGEN SATURATION: 98 % | BODY MASS INDEX: 46.38 KG/M2 | HEART RATE: 92 BPM | TEMPERATURE: 97.8 F | DIASTOLIC BLOOD PRESSURE: 73 MMHG | HEIGHT: 62 IN

## 2023-09-11 VITALS
SYSTOLIC BLOOD PRESSURE: 111 MMHG | WEIGHT: 254.13 LBS | TEMPERATURE: 98.1 F | OXYGEN SATURATION: 98 % | BODY MASS INDEX: 46.76 KG/M2 | HEIGHT: 62 IN | RESPIRATION RATE: 18 BRPM | DIASTOLIC BLOOD PRESSURE: 70 MMHG | HEART RATE: 92 BPM

## 2023-09-11 DIAGNOSIS — D75.839 THROMBOCYTOSIS: ICD-10-CM

## 2023-09-11 DIAGNOSIS — R20.2 ARM PARESTHESIA, LEFT: ICD-10-CM

## 2023-09-11 DIAGNOSIS — E78.5 HYPERLIPIDEMIA, UNSPECIFIED HYPERLIPIDEMIA TYPE: ICD-10-CM

## 2023-09-11 DIAGNOSIS — Z71.3 ENCOUNTER FOR DIETARY CONSULTATION: ICD-10-CM

## 2023-09-11 DIAGNOSIS — Z71.3 WEIGHT LOSS COUNSELING, ENCOUNTER FOR: ICD-10-CM

## 2023-09-11 DIAGNOSIS — R06.83 SNORING: ICD-10-CM

## 2023-09-11 DIAGNOSIS — E66.01 MORBID OBESITY (HCC): Primary | ICD-10-CM

## 2023-09-11 DIAGNOSIS — R73.03 PREDIABETES: Primary | ICD-10-CM

## 2023-09-11 DIAGNOSIS — K59.00 CONSTIPATION, UNSPECIFIED CONSTIPATION TYPE: ICD-10-CM

## 2023-09-11 PROCEDURE — 99214 OFFICE O/P EST MOD 30 MIN: CPT | Performed by: INTERNAL MEDICINE

## 2023-09-11 PROCEDURE — 99213 OFFICE O/P EST LOW 20 MIN: CPT | Performed by: NURSE PRACTITIONER

## 2023-09-11 ASSESSMENT — ENCOUNTER SYMPTOMS
EYES NEGATIVE: 1
ALLERGIC/IMMUNOLOGIC NEGATIVE: 1
GASTROINTESTINAL NEGATIVE: 1
RESPIRATORY NEGATIVE: 1

## 2023-09-11 NOTE — PROGRESS NOTES
INTERNISTS OF [facility]:  9/17/2023, MRN: [id number]      Truitt Pallas is a 34 y.o. female and presents to clinic for ADHD, Glaucoma, and Other (PreDiabetic)      Subjective:   She is a 40yo female with h/o thrombocytosis (followed by [facility]- [facility]), previous positive PPD, Covid-19,  prediabetes, Glaucoma (followed by ), microscopic hematuria (evaluated by [facility]), asthma, ADHD per neuropsychological testing (8/24/22), anxiety, allergic rhinitis, HLD, and obesity. 1.  Prediabetes/HLD and Constipation: At her last visit, I ordered Rybelsus 3 mg daily followed by 7 mg daily dosing a month after completing the 3 mg daily dosing. Her weight was 268 pounds in June. Her weight is now down to 254 pounds. Followed by our medically supervised weight loss program.  Her last A1c was 5.8 in June. Her lipid panel at that time also showed an LDL of 161, up from 120 in May 2022. Her total cholesterol was 243 when checked in June. She has straining with Bms. +Bloating. She has had constipation x 3 wks. No nausea and no abdominal pain. +She eats green vegetables. She declines taking rx. 2.  Thrombocytosis: She has a history of snoring thought to be secondary to IMAN. At her last visit, I referred her to sleep medicine for evaluation. She was also referred to hematology for evaluation. Today she reports: she completed a home sleep study. Results are pending. Her platelet count was in the 500s range when checked in June. She met with Dr. Miguelina Zavala in July. I reviewed the office note. A home sleep study was ordered. She met with [facility] ([place]). A bone marrow bx was recommended. 3.  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 At her last visit, she declined seeing an orthopedist, pharmacotherapy, and doing physical therapy. Today she reports: sx are unchanged.  She declines seeing Orthopedics at this

## 2023-09-17 ASSESSMENT — ENCOUNTER SYMPTOMS
ABDOMINAL PAIN: 0
ANAL BLEEDING: 0
COUGH: 0
BLOOD IN STOOL: 0
SORE THROAT: 0
SHORTNESS OF BREATH: 0
EYE PAIN: 0

## 2023-10-09 ENCOUNTER — OFFICE VISIT (OUTPATIENT)
Age: 38
End: 2023-10-09
Payer: COMMERCIAL

## 2023-10-09 VITALS
SYSTOLIC BLOOD PRESSURE: 135 MMHG | DIASTOLIC BLOOD PRESSURE: 89 MMHG | RESPIRATION RATE: 17 BRPM | HEART RATE: 99 BPM | BODY MASS INDEX: 46.01 KG/M2 | HEIGHT: 62 IN | WEIGHT: 250 LBS | TEMPERATURE: 97.7 F | OXYGEN SATURATION: 98 %

## 2023-10-09 DIAGNOSIS — E66.01 MORBID OBESITY (HCC): Primary | ICD-10-CM

## 2023-10-09 DIAGNOSIS — Z71.3 ENCOUNTER FOR DIETARY CONSULTATION: ICD-10-CM

## 2023-10-09 DIAGNOSIS — Z71.3 WEIGHT LOSS COUNSELING, ENCOUNTER FOR: ICD-10-CM

## 2023-10-09 PROCEDURE — 99213 OFFICE O/P EST LOW 20 MIN: CPT | Performed by: NURSE PRACTITIONER

## 2023-10-09 ASSESSMENT — ENCOUNTER SYMPTOMS
EYES NEGATIVE: 1
GASTROINTESTINAL NEGATIVE: 1
RESPIRATORY NEGATIVE: 1
ALLERGIC/IMMUNOLOGIC NEGATIVE: 1

## 2023-10-09 NOTE — PROGRESS NOTES
[facility] Progress Note:   F/up Provider Visit    Chief Complaint   Patient presents with    Follow-up     Medical Weight Loss - LCD        Lesvia Hanson is a 34 y.o. female who is here for her  f/up medical weight loss visit for the LCD program.Patient did fair this past month she is down 2lbs and 1in overall this visit. -2 lbs lost.   Arm Circumference: 16in  Waist Circumference: 44.5in  Thigh Circumference: 25in  Percent Body Fat: 46.1%      Progress and challenges thus far. Patient endorses that she did not lose as much weight this month because she has not taken the time to always prep her shakes and soups. Advised to plan ahead and perhaps make it so that those items are waiting in the fridge and all she has to do is reach in and grab it and to also ensure that she is consuming the proper amount of water. Diet? LCD    Did you have any problems adhering to the program last week? No.  If yes, please explain:     Since your last visit, have you experienced any complications? No.    Have you received any other medical care this week? No.  If yes, where and for what? Have you had any change in your medications since your last visit? No.  If yes what? Are you taking an appetite suppressant? Rybelsus managed by PCP. If yes:  Do you need a refill? BP Readings from Last 3 Encounters:   10/09/23 135/89   09/11/23 111/70   09/11/23 112/73        Eating Habits Over Last Week:  Did you take in 64 oz of non-caloric fluids? No.    Did you consume your prescribed meal replacement regimen each day? No. Patient reports having difficulty with time management.       Physical Activity Over the Past Week:    Aerobic exercise: 0 min  Resistance exercise: 0 workouts / week      Weight History  Starting wt: 265lbs  Goal wt: 160lbs  EKG due: None  Ideal body weight: 50.1 kg (110 lb 7.2 oz)  Adjusted ideal body weight: 75.4 kg (166 lb 4.3 oz)  Body mass index is 45.73

## 2023-10-20 ENCOUNTER — OFFICE VISIT (OUTPATIENT)
Age: 38
End: 2023-10-20
Payer: COMMERCIAL

## 2023-10-20 ENCOUNTER — CLINICAL DOCUMENTATION (OUTPATIENT)
Age: 38
End: 2023-10-20

## 2023-10-20 VITALS
DIASTOLIC BLOOD PRESSURE: 61 MMHG | BODY MASS INDEX: 45.64 KG/M2 | WEIGHT: 248 LBS | HEART RATE: 90 BPM | OXYGEN SATURATION: 97 % | HEIGHT: 62 IN | RESPIRATION RATE: 18 BRPM | SYSTOLIC BLOOD PRESSURE: 106 MMHG | TEMPERATURE: 98 F

## 2023-10-20 DIAGNOSIS — E66.01 CLASS 3 SEVERE OBESITY DUE TO EXCESS CALORIES WITHOUT SERIOUS COMORBIDITY WITH BODY MASS INDEX (BMI) OF 45.0 TO 49.9 IN ADULT (HCC): ICD-10-CM

## 2023-10-20 DIAGNOSIS — D75.839 THROMBOCYTOSIS: ICD-10-CM

## 2023-10-20 DIAGNOSIS — F51.12 BEHAVIORALLY INDUCED INSUFFICIENT SLEEP SYNDROME: ICD-10-CM

## 2023-10-20 DIAGNOSIS — G47.33 MODERATE OBSTRUCTIVE SLEEP APNEA: Primary | ICD-10-CM

## 2023-10-20 DIAGNOSIS — F90.9 ATTENTION DEFICIT HYPERACTIVITY DISORDER (ADHD), UNSPECIFIED ADHD TYPE: ICD-10-CM

## 2023-10-20 DIAGNOSIS — R73.03 PREDIABETES: ICD-10-CM

## 2023-10-20 PROBLEM — E66.813 CLASS 3 SEVERE OBESITY DUE TO EXCESS CALORIES WITHOUT SERIOUS COMORBIDITY WITH BODY MASS INDEX (BMI) OF 45.0 TO 49.9 IN ADULT: Status: ACTIVE | Noted: 2023-10-20

## 2023-10-20 PROCEDURE — 99214 OFFICE O/P EST MOD 30 MIN: CPT | Performed by: OTOLARYNGOLOGY

## 2023-10-20 ASSESSMENT — PATIENT HEALTH QUESTIONNAIRE - PHQ9
SUM OF ALL RESPONSES TO PHQ QUESTIONS 1-9: 0
1. LITTLE INTEREST OR PLEASURE IN DOING THINGS: 0
SUM OF ALL RESPONSES TO PHQ QUESTIONS 1-9: 0
SUM OF ALL RESPONSES TO PHQ QUESTIONS 1-9: 0
SUM OF ALL RESPONSES TO PHQ9 QUESTIONS 1 & 2: 0
2. FEELING DOWN, DEPRESSED OR HOPELESS: 0
SUM OF ALL RESPONSES TO PHQ QUESTIONS 1-9: 0

## 2023-10-20 ASSESSMENT — SLEEP AND FATIGUE QUESTIONNAIRES
HOW LIKELY ARE YOU TO NOD OFF OR FALL ASLEEP IN A CAR, WHILE STOPPED FOR A FEW MINUTES IN TRAFFIC: 0
ESS TOTAL SCORE: 7
HOW LIKELY ARE YOU TO NOD OFF OR FALL ASLEEP WHEN YOU ARE A PASSENGER IN A CAR FOR AN HOUR WITHOUT A BREAK: 1
HOW LIKELY ARE YOU TO NOD OFF OR FALL ASLEEP WHILE LYING DOWN TO REST IN THE AFTERNOON WHEN CIRCUMSTANCES PERMIT: 3
HOW LIKELY ARE YOU TO NOD OFF OR FALL ASLEEP WHILE WATCHING TV: 1
HOW LIKELY ARE YOU TO NOD OFF OR FALL ASLEEP WHILE SITTING QUIETLY AFTER LUNCH WITHOUT ALCOHOL: 2
HOW LIKELY ARE YOU TO NOD OFF OR FALL ASLEEP WHILE SITTING AND TALKING TO SOMEONE: 0
HOW LIKELY ARE YOU TO NOD OFF OR FALL ASLEEP WHILE SITTING AND READING: 0
HOW LIKELY ARE YOU TO NOD OFF OR FALL ASLEEP WHILE SITTING INACTIVE IN A PUBLIC PLACE: 0

## 2023-10-20 NOTE — ASSESSMENT & PLAN NOTE
I have discussed the results of the sleep study with the patient to include pertinent information regarding diagnostic findings on the study. We discussed the nature of obstructive apneas and why it would be important to treat this as it relates to other medical disorders. I advised the patient that CPAP is the gold standard treatment and is recommended in most cases of obstructive sleep apnea and is considered first-line treatment. We also discussed other possible options for treatment to include weight loss, oral appliances and optimization of nasal patency or other surgical options that could be considered if applicable. I recommend we start AutoPAP (APAP) and have the patient follow-up in the office 6 to 8 weeks after getting and starting to use the machine. We briefly discussed how the machine works and what the definition of compliance is as well. We also reviewed the risks of driving while sleepy and discussed the fact that the patient should have a plan to combat or prevent driving while excessively sleepy. Patient asked appropriate questions would like to move forward with treating the obstructive sleep apnea with CPAP (APAP).

## 2023-10-20 NOTE — ASSESSMENT & PLAN NOTE
Will be interesting to see if this improves over time after treatment of her moderate obstructive sleep apnea

## 2023-10-20 NOTE — PATIENT INSTRUCTIONS
Please make a follow up appointment to discuss the results of your sleep study. If this is impossible for some reason, please send me a \"My Chart\" message so that I may get back with you in a timely manner. The [facility] is located in the [address] [facility], adjacent to [facility]. The lab is on the second floor. The direct number to call for sleep study related questions is: [phone]. Please call our clinic back at [phone] or send a message on Watchsend if you have any questions or concerns or if you are experiencing any of the following: You have not received a follow up appointment within 30 days prior the recommended follow up time. If you are not tolerating treatment plan and/or not able to obtain equipment or prescribed medication(s). if you are experiencing any difficulties with the [address] [place] [address] [facility]  (DME) Company you may be using or is assigned to you. Two weeks have passed and you have not received an appointment for a scheduled procedure. Two weeks have passed since you underwent a test and/or procedure and you have not received your results. If you are using a CPAP/BIPAP, or Home Ventilator Device- Please note the following. Currently, many DMEs are experiencing supply chain difficulties and orders for equipment may be back logged several weeks. Your  Durable Medical Equipment (DME ) company is supposed to provide you with replacement filters, tubing and masks. You can either call your DME when you need new supplies or you can arrange for an automatic shipment schedule. Your need to be seen by our office at lat minimum of every 12 months in order to renew the prescription for these supplies. Please make note of who your DME company is and their phone number. Please make sure that you clean your mask and hosing on a regular basis.   Your DME can provide you with additional information regarding proper care and cleaning of your [FreeTextEntry8] : 28M NKDA with no PMH and  with Appendectomy PSH in 2010 who  [facility] to pimple on lower lip.\par \par States that the little pimple on right lower lip started few days ago and he  popped out pus of the pimple. .He applied Neosporin with no effect. He is fully vaccinated with COVID vaccine . Denies any fever, cough, sore throat or SOB. No loss of smell or taste, no chest tightness, or any GI related symptoms of nausea, vomiting or diarrhea. 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 He does not  take regular maintenance medications. Denies any chest pain, no chest palpitations or any respiratory distress\par \par \par \par

## 2023-10-20 NOTE — PROGRESS NOTES
Lelo Norris presents today for   Chief Complaint   Patient presents with    Follow-up    Results       Is someone accompanying this pt? no    Is the patient using any DME equipment during OV? no    -DME Company N/A    Depression Screening:      10/20/2023    12:10 PM   PHQ-9    Little interest or pleasure in doing things 0   Feeling down, depressed, or hopeless 0   PHQ-2 Score 0   PHQ-9 Total Score 0        [place] Sleepiness Scale:      10/20/2023    12:11 PM   Sleep Medicine   Sitting and reading 0   Watching TV 1   Sitting, inactive in a public place (e.g. a theatre or a meeting) 0   As a passenger in a car for an hour without a break 1   Lying down to rest in the afternoon when circumstances permit 3   Sitting and talking to someone 0   Sitting quietly after a lunch without alcohol 2   In a car, while stopped for a few minutes in traffic 0   [place] Sleepiness Score 7       Stop-Ban/17/2023     2:00 PM   STOP-BANG QUESTIONNAIRE   Are you a loud and/or regular snorer? 1   Do you often feel tired or groggy upon awakening or do you awaken with a headache? 1   Have you been observed to gasp or stop breathing during sleep? 0   Are you often tired or fatigued during wake time hours? 0   Do you fall asleep sitting, reading, watching TV or driving? 0   Do you often have problems with memory or concentration? 0   Do you have or are you being treated for high blood pressure? 0   Recent BMI (Calculated) 49.2   Is BMI greater than 35 kg/m2? 1=Yes   Age older than 48years old? 0=No   Is your neck circumference greater than 17 inches (Male) or 16 inches (Female)? 0   Gender - Male 0=No   STOP-Bang Total Score 52.2           Coordination of Care:  1. Have you been to the ER, urgent care clinic since your last visit? Hospitalized since your last visit?  no    2. Have you seen or consulted any other health care providers outside of the [address] [place] [address] since your last visit?  Include any pap smears or
color, texture, turgor normal. No rashes or lesions. Neurological: CN 2-12 grossly intact, normal muscle tone, no focal deficits    Data Reviewed:  CBC:   Lab Results   Component Value Date/Time    WBC 4.6 06/02/2023 10:43 AM    WBC 5.6 05/28/2022 10:18 AM    HGB 13.9 06/02/2023 10:43 AM    HCT 44.8 06/02/2023 10:43 AM     06/02/2023 10:43 AM    MCV 90 06/02/2023 10:43 AM       BMP:   Lab Results   Component Value Date/Time     06/02/2023 10:43 AM    K 4.4 06/02/2023 10:43 AM     06/02/2023 10:43 AM    CO2 26 06/02/2023 10:43 AM    BUN 14 06/02/2023 10:43 AM    GFRAA >60 05/28/2022 10:18 AM       Latest Reference Range & Units Most Recent 10/21/21 - 10/20/23   Glucose, Random 70 - 99 mg/dL 100 (H)  6/2/23 10:43   Hemoglobin A1C 4.8 - 5.6 % 5.8 (H)  6/2/23 10:43   WBC 4.0 - 11.0 K/uL 4.6  6/2/23 10:43   Hemoglobin Quant 11.7 - 15.5 g/dL 13.9  6/2/23 10:43   Hematocrit 35.1 - 46.5 % 44.8  6/2/23 10:43   Platelet Count 813 - 440 K/uL 528 (H)  6/2/23 10:43   HDL Cholesterol >=40 mg/dL 70  6/2/23 10:43   LDL Calculated 50 - 99 mg/dL 161 (H)  6/2/23 10:43   Triglycerides 40 - 149 mg/dL 59  6/2/23 10:43   (H): Data is abnormally high      Historical Sleep Testing Data:   Watchpat done 8/30/32  TST: 6:33  AHI 24.2  O2 tyree: 88%    Ravinder Umaña DO, [place]  Sleep Medicine     Total time spent on this encounter, including previsit review of of separately obtained history, face to face interaction, performing medically appropriate physical exam, patient/counseling, ordering tests, care coordination and documentation was  21  minutes. I did review any recent lab work, placed a order/prescription for a auto titrating CPAP machine. Obstructive sleep apnea is a new diagnosed problem with undetermined course. I did review the referring physician's notes for clarification about the reason for referral and what her lab values have been over time (thrombocytosis).     This note was dictated utilizing voice

## 2023-10-27 NOTE — TELEPHONE ENCOUNTER
Please refill if appropriate or refuse medication if not appropriate.     PCP: Medardo Pratt MD     Last appt: 9/11/23    Last refill: 8/17/23      Future Appointments   Date Time Provider [address] [place] [address]   11/10/2023  3:30 PM LYNNE Johansen - CNP [facility] BS AMB   1/15/2024 10:00 AM Medardo Pratt MD [facility] BS AMB         Requested Prescriptions     Pending Prescriptions Disp Refills    Semaglutide (RYBELSUS) 14 MG TABS 30 tablet 5     Sig: Take 14 mg by mouth daily

## 2023-10-30 RX ORDER — ORAL SEMAGLUTIDE 14 MG/1
14 TABLET ORAL DAILY
Qty: 30 TABLET | Refills: 5 | Status: SHIPPED | OUTPATIENT
Start: 2023-10-30

## 2023-10-30 RX ORDER — ORAL SEMAGLUTIDE 14 MG/1
14 TABLET ORAL DAILY
Qty: 30 TABLET | Refills: 5 | Status: SHIPPED | OUTPATIENT
Start: 2023-10-30 | End: 2023-10-30 | Stop reason: SDUPTHER

## 2023-11-01 ENCOUNTER — TELEPHONE (OUTPATIENT)
Age: 38
End: 2023-11-01

## 2023-11-03 NOTE — TELEPHONE ENCOUNTER
She declines metformin. Pt notified that her rybelsus was denied via her PA.       Dr. Manjinder Parmar  [facility] [address]  Phone: [phone]  Fax: [phone]

## 2023-11-13 ENCOUNTER — TELEMEDICINE (OUTPATIENT)
Age: 38
End: 2023-11-13
Payer: COMMERCIAL

## 2023-11-13 DIAGNOSIS — J01.80 ACUTE NON-RECURRENT SINUSITIS OF OTHER SINUS: Primary | ICD-10-CM

## 2023-11-13 DIAGNOSIS — R43.2 LOSS OF TASTE: ICD-10-CM

## 2023-11-13 DIAGNOSIS — R43.0 SENSE OF SMELL LOST: ICD-10-CM

## 2023-11-13 PROCEDURE — 99213 OFFICE O/P EST LOW 20 MIN: CPT | Performed by: INTERNAL MEDICINE

## 2023-11-13 RX ORDER — AZITHROMYCIN 250 MG/1
250 TABLET, FILM COATED ORAL SEE ADMIN INSTRUCTIONS
Qty: 6 TABLET | Refills: 0 | Status: SHIPPED | OUTPATIENT
Start: 2023-11-13 | End: 2023-11-18

## 2023-11-13 ASSESSMENT — ENCOUNTER SYMPTOMS
HOARSE VOICE: 1
SINUS PRESSURE: 1
SINUS COMPLAINT: 1
COUGH: 0

## 2023-11-13 NOTE — PROGRESS NOTES
Truitt Pallas, was evaluated through a synchronous (real-time) audio-video encounter. The patient (or guardian if applicable) is aware that this is a billable service, which includes applicable co-pays. This Virtual Visit was conducted with patient's (and/or legal guardian's) consent. Patient identification was verified, and a caregiver was present when appropriate. The patient was located at Home: [address] [place] [id number]  Provider was located at [facility] (Appt Dept): [facility] [place], [address] [facility],  [address] [place] [address]      Truitt Pallas (:  1985) is an established patient, presenting virtually for evaluation of the following:    Assessment & Plan   Below is the assessment and plan developed based on review of pertinent history, physical exam, labs, studies, and medications. ICD-10-CM    1. Acute non-recurrent sinusitis of other sinus  J01.80 Will treat with azithromycin (ZITHROMAX) 250 MG tablet. 2. Sense of smell lost  R43.0 If not improving after treatment of possible sinus infection will need to follow-up with ENT for further evaluation      3. Loss of taste  R43.2 Management as per #2. Return if symptoms worsen or fail to improve. Subjective   Patient for the last 10 days has lost her smell and taste and has had sinus congestion and drainage. She is taken for COVID test with the last 1 about 3 days ago which have been negative. She is use over-the-counter medications without any significant improvement and is currently having some yellow nasal discharge. Sinus Problem  This is a new problem. The current episode started in the past 7 days (10 days ago). The problem has been waxing and waning since onset. There has been no fever. The pain is mild. Associated symptoms include congestion, a hoarse voice and sinus pressure. Pertinent negatives include no chills or coughing. Past treatments include oral decongestants. The treatment provided no relief.

## 2024-01-06 LAB
A/G RATIO: 1.4 RATIO (ref 1.1–2.6)
ALBUMIN SERPL-MCNC: 4.4 G/DL (ref 3.5–5)
ALP BLD-CCNC: 50 U/L (ref 25–115)
ALT SERPL-CCNC: 19 U/L (ref 5–40)
ANION GAP SERPL CALCULATED.3IONS-SCNC: 12 MMOL/L (ref 3–15)
AST SERPL-CCNC: 14 U/L (ref 10–37)
AVERAGE GLUCOSE: 108 MG/DL (ref 91–123)
BASOPHILS # BLD: 1 % (ref 0–2)
BASOPHILS ABSOLUTE: 0 K/UL (ref 0–0.2)
BILIRUB SERPL-MCNC: 0.2 MG/DL (ref 0.2–1.2)
BUN BLDV-MCNC: 11 MG/DL (ref 6–22)
CALCIUM SERPL-MCNC: 9.7 MG/DL (ref 8.4–10.5)
CHLORIDE BLD-SCNC: 101 MMOL/L (ref 98–110)
CHOLESTEROL/HDL RATIO: 2.9 (ref 0–5)
CHOLESTEROL: 223 MG/DL (ref 110–200)
CO2: 24 MMOL/L (ref 20–32)
CREAT SERPL-MCNC: 0.8 MG/DL (ref 0.5–1.2)
EOSINOPHIL # BLD: 2 % (ref 0–6)
EOSINOPHILS ABSOLUTE: 0.1 K/UL (ref 0–0.5)
GLOBULIN: 3.1 G/DL (ref 2–4)
GLOMERULAR FILTRATION RATE: >60 ML/MIN/1.73 SQ.M.
GLUCOSE: 91 MG/DL (ref 70–99)
HBA1C MFR BLD: 5.4 % (ref 4.8–5.6)
HCT VFR BLD CALC: 39.6 % (ref 35.1–46.5)
HDLC SERPL-MCNC: 77 MG/DL
HEMOGLOBIN: 12.7 G/DL (ref 11.7–15.5)
LDL CHOLESTEROL CALCULATED: 136 MG/DL (ref 50–99)
LDL/HDL RATIO: 1.8
LYMPHOCYTES # BLD: 35 % (ref 20–45)
LYMPHOCYTES ABSOLUTE: 1.9 K/UL (ref 1–4.8)
MCH RBC QN AUTO: 28 PG (ref 26–34)
MCHC RBC AUTO-ENTMCNC: 32 G/DL (ref 31–36)
MCV RBC AUTO: 87 FL (ref 80–99)
MONOCYTES ABSOLUTE: 0.7 K/UL (ref 0.1–1)
MONOCYTES: 12 % (ref 3–12)
NEUTROPHILS ABSOLUTE: 2.9 K/UL (ref 1.8–7.7)
NEUTROPHILS: 51 % (ref 40–75)
NON-HDL CHOLESTEROL: 146 MG/DL
PDW BLD-RTO: 13 % (ref 10–15.5)
PLATELET # BLD: 469 K/UL (ref 140–440)
PMV BLD AUTO: 9.4 FL (ref 9–13)
POTASSIUM SERPL-SCNC: 4.6 MMOL/L (ref 3.5–5.5)
RBC: 4.54 M/UL (ref 3.8–5.2)
SODIUM BLD-SCNC: 137 MMOL/L (ref 133–145)
TOTAL PROTEIN: 7.5 G/DL (ref 6.4–8.3)
TRIGL SERPL-MCNC: 49 MG/DL (ref 40–149)
VLDLC SERPL CALC-MCNC: 10 MG/DL (ref 8–30)
WBC: 5.6 K/UL (ref 4–11)

## 2024-01-15 ENCOUNTER — OFFICE VISIT (OUTPATIENT)
Age: 39
End: 2024-01-15
Payer: COMMERCIAL

## 2024-01-15 VITALS
HEART RATE: 84 BPM | HEIGHT: 62 IN | OXYGEN SATURATION: 99 % | WEIGHT: 256.6 LBS | SYSTOLIC BLOOD PRESSURE: 118 MMHG | RESPIRATION RATE: 18 BRPM | TEMPERATURE: 97.6 F | DIASTOLIC BLOOD PRESSURE: 70 MMHG | BODY MASS INDEX: 47.22 KG/M2

## 2024-01-15 DIAGNOSIS — E78.5 HYPERLIPIDEMIA, UNSPECIFIED HYPERLIPIDEMIA TYPE: ICD-10-CM

## 2024-01-15 DIAGNOSIS — R73.03 PREDIABETES: Primary | ICD-10-CM

## 2024-01-15 DIAGNOSIS — Z12.31 ENCOUNTER FOR SCREENING MAMMOGRAM FOR BREAST CANCER: ICD-10-CM

## 2024-01-15 DIAGNOSIS — K59.00 CONSTIPATION, UNSPECIFIED CONSTIPATION TYPE: ICD-10-CM

## 2024-01-15 DIAGNOSIS — G47.33 OSA (OBSTRUCTIVE SLEEP APNEA): ICD-10-CM

## 2024-01-15 DIAGNOSIS — R20.2 ARM PARESTHESIA, LEFT: ICD-10-CM

## 2024-01-15 DIAGNOSIS — D75.839 THROMBOCYTOSIS: ICD-10-CM

## 2024-01-15 DIAGNOSIS — D25.9 UTERINE LEIOMYOMA, UNSPECIFIED LOCATION: ICD-10-CM

## 2024-01-15 PROCEDURE — 99214 OFFICE O/P EST MOD 30 MIN: CPT | Performed by: INTERNAL MEDICINE

## 2024-01-15 ASSESSMENT — PATIENT HEALTH QUESTIONNAIRE - PHQ9
SUM OF ALL RESPONSES TO PHQ QUESTIONS 1-9: 0
SUM OF ALL RESPONSES TO PHQ QUESTIONS 1-9: 0
1. LITTLE INTEREST OR PLEASURE IN DOING THINGS: 0
SUM OF ALL RESPONSES TO PHQ QUESTIONS 1-9: 0
2. FEELING DOWN, DEPRESSED OR HOPELESS: 0
SUM OF ALL RESPONSES TO PHQ QUESTIONS 1-9: 0
SUM OF ALL RESPONSES TO PHQ9 QUESTIONS 1 & 2: 0

## 2024-01-15 ASSESSMENT — ENCOUNTER SYMPTOMS
ANAL BLEEDING: 0
ABDOMINAL PAIN: 0
SHORTNESS OF BREATH: 0
EYE PAIN: 0
COUGH: 0
SORE THROAT: 0
BLOOD IN STOOL: 0

## 2024-01-15 NOTE — PROGRESS NOTES
Deepthi Hagan presents today for   Chief Complaint   Patient presents with    Follow-up     4 months follow up    Snoring     Follow up    Other     Thrombocystis follow up                 1. \"Have you been to the ER, urgent care clinic since your last visit?  Hospitalized since your last visit?\" no    2. \"Have you seen or consulted any other health care providers outside of the [facility] since your last visit?\" no     3. For patients aged 45-75: Has the patient had a colonoscopy / FIT/ Cologuard? NA - based on age      If the patient is female:    4. For patients aged 40-74: Has the patient had a mammogram within the past 2 years? No      5. For patients aged 21-65: Has the patient had a pap smear? No

## 2024-01-15 NOTE — PROGRESS NOTES
[facility] OF [facility]:  1/15/2024, MRN: [id number]      Deepthi Hagan is a 38 y.o. female and presents to clinic for Follow-up (4 months follow up), Snoring (Follow up), and Other (Thrombocystis follow up)      Subjective:   She is a 37yo female with h/o thrombocytosis (followed by [facility]- ), previous positive PPD, Covid-19,  prediabetes, Glaucoma (followed by ), microscopic hematuria (evaluated by [facility]), asthma, ADHD per neuropsychological testing (8/24/22), anxiety, allergic rhinitis, HLD, and obesity.     1.  Prediabetes: Previously followed by her medically [facility] weight loss program.  The provider just left the program last month.  She has a history of prediabetes and hyperlipidemia.  Her total cholesterol was 243 when checked last summer.  Her A1c last summer was 5.8.  She was prescribed Rybelsus for assistance with prediabetes and weight reduction.  In August of last year her weight was 255 pounds.  Her weight today is 256 pounds.  She is no longer taking Rybelsus 14 mg daily.  She's been out of this rx a month.     January 6 labs show that her creatinine is 0.8 and unchanged.  Her electrolytes are unremarkable.  Her total cholesterol was 223, down from 243 last summer.  HDL is 77, up from 70.  Her LDL is down to 136 from 161 last summer.  Her triglycerides are 49, down from 59.  LFTs are unremarkable.  Her A1c is 5.4, down from 5.8 last summer.    2.  Thrombocytosis and IMAN: She has a history of thrombocytosis secondary to underlying obstructive sleep apnea and has been evaluated by sleep medicine and hematology for this.  She is followed by  with [facility] and our Sleep Med team (Dr. Price).  A CPAP machine was recommended as her sleep study last year showed underlying IMAN.  CPAP use: yes.  Her platelet count is down to 469 from 528 last summer.    3.  Constipation: Reported at her last appointment.  She declined prescription only

## 2024-02-19 ENCOUNTER — HOSPITAL ENCOUNTER (OUTPATIENT)
Facility: HOSPITAL | Age: 39
Discharge: HOME | End: 2024-02-22
Attending: INTERNAL MEDICINE
Payer: COMMERCIAL

## 2024-02-19 VITALS — WEIGHT: 256.62 LBS | BODY MASS INDEX: 47.22 KG/M2 | HEIGHT: 62 IN

## 2024-02-19 DIAGNOSIS — Z12.31 ENCOUNTER FOR SCREENING MAMMOGRAM FOR BREAST CANCER: ICD-10-CM

## 2024-02-19 DIAGNOSIS — D25.9 UTERINE LEIOMYOMA, UNSPECIFIED LOCATION: ICD-10-CM

## 2024-02-19 PROCEDURE — 77063 BREAST TOMOSYNTHESIS BI: CPT

## 2024-02-19 PROCEDURE — 93975 VASCULAR STUDY: CPT

## 2024-02-23 ENCOUNTER — OFFICE VISIT (OUTPATIENT)
Age: 39
End: 2024-02-23
Payer: COMMERCIAL

## 2024-02-23 VITALS
DIASTOLIC BLOOD PRESSURE: 77 MMHG | BODY MASS INDEX: 47.29 KG/M2 | OXYGEN SATURATION: 98 % | SYSTOLIC BLOOD PRESSURE: 137 MMHG | HEIGHT: 62 IN | TEMPERATURE: 97.9 F | HEART RATE: 98 BPM | WEIGHT: 257 LBS | RESPIRATION RATE: 18 BRPM

## 2024-02-23 DIAGNOSIS — E66.01 CLASS 3 SEVERE OBESITY DUE TO EXCESS CALORIES WITHOUT SERIOUS COMORBIDITY WITH BODY MASS INDEX (BMI) OF 45.0 TO 49.9 IN ADULT (HCC): ICD-10-CM

## 2024-02-23 DIAGNOSIS — G47.33 MODERATE OBSTRUCTIVE SLEEP APNEA: Primary | ICD-10-CM

## 2024-02-23 DIAGNOSIS — D75.839 THROMBOCYTOSIS: ICD-10-CM

## 2024-02-23 DIAGNOSIS — R73.03 PREDIABETES: ICD-10-CM

## 2024-02-23 DIAGNOSIS — R06.83 SNORING: ICD-10-CM

## 2024-02-23 DIAGNOSIS — F90.9 ATTENTION DEFICIT HYPERACTIVITY DISORDER (ADHD), UNSPECIFIED ADHD TYPE: ICD-10-CM

## 2024-02-23 DIAGNOSIS — F51.12 BEHAVIORALLY INDUCED INSUFFICIENT SLEEP SYNDROME: ICD-10-CM

## 2024-02-23 PROCEDURE — 99214 OFFICE O/P EST MOD 30 MIN: CPT | Performed by: OTOLARYNGOLOGY

## 2024-02-23 RX ORDER — BIMATOPROST 0.1 MG/ML
SOLUTION/ DROPS OPHTHALMIC
COMMUNITY
Start: 2024-02-07

## 2024-02-23 ASSESSMENT — PATIENT HEALTH QUESTIONNAIRE - PHQ9
2. FEELING DOWN, DEPRESSED OR HOPELESS: 0
SUM OF ALL RESPONSES TO PHQ QUESTIONS 1-9: 0
SUM OF ALL RESPONSES TO PHQ9 QUESTIONS 1 & 2: 0
SUM OF ALL RESPONSES TO PHQ QUESTIONS 1-9: 0
SUM OF ALL RESPONSES TO PHQ QUESTIONS 1-9: 0
1. LITTLE INTEREST OR PLEASURE IN DOING THINGS: 0
SUM OF ALL RESPONSES TO PHQ QUESTIONS 1-9: 0

## 2024-02-23 ASSESSMENT — SLEEP AND FATIGUE QUESTIONNAIRES
HOW LIKELY ARE YOU TO NOD OFF OR FALL ASLEEP WHEN YOU ARE A PASSENGER IN A CAR FOR AN HOUR WITHOUT A BREAK: 2
HOW LIKELY ARE YOU TO NOD OFF OR FALL ASLEEP WHILE SITTING AND READING: 0
HOW LIKELY ARE YOU TO NOD OFF OR FALL ASLEEP WHILE LYING DOWN TO REST IN THE AFTERNOON WHEN CIRCUMSTANCES PERMIT: 2
HOW LIKELY ARE YOU TO NOD OFF OR FALL ASLEEP WHILE SITTING QUIETLY AFTER LUNCH WITHOUT ALCOHOL: 2
HOW LIKELY ARE YOU TO NOD OFF OR FALL ASLEEP WHILE WATCHING TV: 0
HOW LIKELY ARE YOU TO NOD OFF OR FALL ASLEEP IN A CAR, WHILE STOPPED FOR A FEW MINUTES IN TRAFFIC: 0
HOW LIKELY ARE YOU TO NOD OFF OR FALL ASLEEP WHILE SITTING INACTIVE IN A PUBLIC PLACE: 0
ESS TOTAL SCORE: 6
HOW LIKELY ARE YOU TO NOD OFF OR FALL ASLEEP WHILE SITTING AND TALKING TO SOMEONE: 0

## 2024-02-23 NOTE — PROGRESS NOTES
[facility]  Pulmonary, Critical Care, and Sleep Medicine    Office Progress Note      Primary Care Physician: Edyta Orr MD     Reason for Visit:  Follow up Obstructive Sleep Apnea on cpap  Assessment:  1. Moderate obstructive sleep apnea  Assessment & Plan:   Well-controlled, continue current medications and continue current treatment plan, patient is not exactly thrilled with her current mask which is an oral nasal mask.  Her mask does leak substantially at times.  We went over her data from her CPAP download and her sleep apnea is being well-controlled.  She is only sleeping around 6 hours a night which we discussed today.  It would be nice if she could sleep at least 7.  She feels that her current mask is waking her up frequently and is actually making things worse.    As such, I am going to send an order to [facility] for supplies and for an AirFit P30 I nasal pillows mask to try.  Also, the patient can call and arrange for a mask fitting if that mask is not suitable.  Orders:  -     DME - DURABLE MEDICAL EQUIPMENT  2. Snoring  Comments:  Resolved with CPAP use  3. Attention deficit hyperactivity disorder (ADHD), unspecified ADHD type  4. Prediabetes  Comments:  Most recent A1C down to 5.4!  Assessment & Plan:   Well-controlled, continue current medications, A1C down to 5.4 (was 5.8 prior)  5. Class 3 severe obesity due to excess calories without serious comorbidity with body mass index (BMI) of 45.0 to 49.9 in adult (HCC)  6. Behaviorally induced insufficient sleep syndrome  7. Thrombocytosis  Assessment & Plan:   Improved, hematocrit has gone from 44.8 last June to a recent value of 39.6.  Assuming this is due to CPAP use but in any event it is trending in the right direction and is within normal limits       Patient reports she is doing well.  Complaints today: Mask leaking and sometimes her mouth is dry in the morning.  I pointed out that if her mask is leaking her mouth

## 2024-02-23 NOTE — ASSESSMENT & PLAN NOTE
Improved, hematocrit has gone from 44.8 last June to a recent value of 39.6.  Assuming this is due to CPAP use but in any event it is trending in the right direction and is within normal limits

## 2024-02-23 NOTE — ASSESSMENT & PLAN NOTE
Well-controlled, continue current medications and continue current treatment plan, patient is not exactly thrilled with her current mask which is an oral nasal mask.  Her mask does leak substantially at times.  We went over her data from her CPAP download and her sleep apnea is being well-controlled.  She is only sleeping around 6 hours a night which we discussed today.  It would be nice if she could sleep at least 7.  She feels that her current mask is waking her up frequently and is actually making things worse.    As such, I am going to send an order to [facility] for supplies and for an AirFit P30 I nasal pillows mask to try.  Also, the patient can call and arrange for a mask fitting if that mask is not suitable.

## 2024-02-23 NOTE — PATIENT INSTRUCTIONS
Please make a follow up appointment to discuss the results of your sleep study. If this is impossible for some reason, please send me a \"My Chart\" message so that I may get back with you in a timely manner.    The [facility] is located in the [facility], adjacent to [facility]. The lab is on the second floor. The direct number to call for sleep study related questions is: [phone].    Please call our clinic back at [phone] or send a message on Tavern if you have any questions or concerns or if you are experiencing any of the following:     You have not received a follow up appointment within 30 days prior the recommended follow up time.    If you are not tolerating treatment plan and/or not able to obtain equipment or prescribed medication(s).  if you are experiencing any difficulties with the [employer] Medical Equipment  (DME) Company you may be using or is assigned to you.  Two weeks have passed and you have not received an appointment for a scheduled procedure.  Two weeks have passed since you underwent a test and/or procedure and you have not received your results.     If you are using a CPAP/BIPAP, or Home Ventilator Device- Please note the following.  Currently, many DMEs are experiencing supply chain difficulties and orders for equipment may be back logged several weeks.     Your  Durable Medical Equipment (DME ) company is supposed to provide you with replacement filters, tubing and masks. You can either call your DME when you need new supplies or you can arrange for an automatic shipment schedule.    Your need to be seen by our office at lat minimum of every 12 months in order to renew the prescription for these supplies.   Please make note of who your DME company is and their phone number.   Please make sure that you clean your mask and hosing on a regular basis.  Your DME can provide you with additional information regarding proper care and cleaning of your

## 2024-02-26 ENCOUNTER — CLINICAL DOCUMENTATION (OUTPATIENT)
Age: 39
End: 2024-02-26

## 2024-02-26 DIAGNOSIS — R92.8 ABNORMAL MAMMOGRAM OF BOTH BREASTS: Primary | ICD-10-CM

## 2024-03-06 ENCOUNTER — HOSPITAL ENCOUNTER (OUTPATIENT)
Facility: HOSPITAL | Age: 39
Discharge: HOME | End: 2024-03-09
Attending: INTERNAL MEDICINE
Payer: COMMERCIAL

## 2024-03-06 DIAGNOSIS — N63.20 MASS OF BOTH BREASTS ON MAMMOGRAM: ICD-10-CM

## 2024-03-06 DIAGNOSIS — R92.8 ABNORMAL MAMMOGRAM OF BOTH BREASTS: Primary | ICD-10-CM

## 2024-03-06 DIAGNOSIS — R92.8 ABNORMAL MAMMOGRAM OF BOTH BREASTS: ICD-10-CM

## 2024-03-06 DIAGNOSIS — N63.10 MASS OF BOTH BREASTS ON MAMMOGRAM: ICD-10-CM

## 2024-03-06 PROCEDURE — 76642 ULTRASOUND BREAST LIMITED: CPT

## 2024-03-06 PROCEDURE — G0279 TOMOSYNTHESIS, MAMMO: HCPCS

## 2024-03-07 NOTE — RESULT ENCOUNTER NOTE
Spoke to pt via phone concerning results. Provided central scheduling info for further scheduling. Pt understood.        GLENN Keane LPN

## 2024-03-22 DIAGNOSIS — N64.4 BREAST PAIN: Primary | ICD-10-CM

## 2024-03-22 DIAGNOSIS — R92.8 ABNORMAL MAMMOGRAM: ICD-10-CM

## 2024-04-18 ENCOUNTER — OFFICE VISIT (OUTPATIENT)
Age: 39
End: 2024-04-18
Payer: COMMERCIAL

## 2024-04-18 VITALS
HEART RATE: 87 BPM | WEIGHT: 265 LBS | BODY MASS INDEX: 48.76 KG/M2 | OXYGEN SATURATION: 99 % | SYSTOLIC BLOOD PRESSURE: 130 MMHG | TEMPERATURE: 98.4 F | RESPIRATION RATE: 18 BRPM | HEIGHT: 62 IN | DIASTOLIC BLOOD PRESSURE: 80 MMHG

## 2024-04-18 DIAGNOSIS — R73.03 PREDIABETES: ICD-10-CM

## 2024-04-18 DIAGNOSIS — D75.839 THROMBOCYTOSIS: ICD-10-CM

## 2024-04-18 DIAGNOSIS — G47.33 OSA (OBSTRUCTIVE SLEEP APNEA): ICD-10-CM

## 2024-04-18 DIAGNOSIS — J02.0 PHARYNGITIS DUE TO STREPTOCOCCUS SPECIES: ICD-10-CM

## 2024-04-18 DIAGNOSIS — R92.8 ABNORMAL MAMMOGRAM: ICD-10-CM

## 2024-04-18 DIAGNOSIS — F90.9 ATTENTION DEFICIT HYPERACTIVITY DISORDER (ADHD), UNSPECIFIED ADHD TYPE: Primary | ICD-10-CM

## 2024-04-18 PROCEDURE — 99214 OFFICE O/P EST MOD 30 MIN: CPT | Performed by: INTERNAL MEDICINE

## 2024-04-18 RX ORDER — ATOMOXETINE 40 MG/1
40 CAPSULE ORAL DAILY
Qty: 30 CAPSULE | Refills: 3 | Status: SHIPPED | OUTPATIENT
Start: 2024-04-18

## 2024-04-18 RX ORDER — AMOXICILLIN AND CLAVULANATE POTASSIUM 875; 125 MG/1; MG/1
1 TABLET, FILM COATED ORAL 2 TIMES DAILY
COMMUNITY

## 2024-04-18 NOTE — PROGRESS NOTES
INTERNISTS OF [facility]:  4/24/2024, MRN: [id number]      Deepthi Hagan is a 38 y.o. female and presents to clinic for Medication Refill      Subjective:   She is a 37yo female with h/o thrombocytosis (followed by [facility]- ), previous positive PPD, Covid-19,  prediabetes, uterine fibroids, Glaucoma (followed by ), microscopic hematuria (evaluated by [facility]), asthma, ADHD per neuropsychological testing (8/24/22), anxiety, allergic rhinitis, HLD, and obesity.     1.  Prediabetes: At her last appointment, she was out of her Rybelsus due to her insurance only covering this medication in the absence of type 2 diabetes.  Her weight today is 265lbs.     2. Thrombocytosis and IMAN: Evaluated by [facility] and Dr. Price with our sleep medicine team.  After undergoing a sleep study, the patient was diagnosed with IMAN.  At her last appointment she reported using her CPAP.  She has reactive thrombocytosis secondary to underlying IMAN.  Today she reports using her CPAP without any issues.  Since her last visit, she met with Dr. Price.  I reviewed the office note.  The patient is to continue using her CPAP.    3.  Abnormal mammogram: Per March studies. A follow up study was recommended in 6 months.    3/6/24 Mammogram: Probably benign right breast masses.  Probably benign left breast focal asymmetry.  BI-RADS CATEGORY: BI-RADS 3: Probably Benign FOLLOW-UP RECOMMENDATIONS: Bilateral Diagnostic Mammogram and Ultrasound in 6 Months    4. Strep Throat: On the last day of abx (augmentin). She noticed some discomfort with swallowing today. No cough/SOB.     5. ADHD: Per neuropsychological testing in the past.  She never tried medication.  She is having problems focusing and completing tasks at work and is interested in taking medication for attention deficit disorder.        Patient Active Problem List    Diagnosis Date Noted    Moderate obstructive sleep apnea 10/20/2023

## 2024-04-18 NOTE — PROGRESS NOTES
Deepthi Hagan presents today for   Chief Complaint   Patient presents with    Medication Refill           \"Have you been to the ER, urgent care clinic since your last visit?  Hospitalized since your last visit?\"    YES - When: approximately 1 months ago.  Where and Why: [facility]; .    “Have you seen or consulted any other health care providers outside of [facility] since your last visit?”    YES - When: approximately 1 months ago.  Where and Why: [place]-1 wk ago- annual OBGYN.

## 2024-04-24 ASSESSMENT — ENCOUNTER SYMPTOMS
SHORTNESS OF BREATH: 0
COUGH: 0
ANAL BLEEDING: 0
BLOOD IN STOOL: 0
EYE PAIN: 0
SORE THROAT: 1
ABDOMINAL PAIN: 0

## 2024-06-16 SDOH — ECONOMIC STABILITY: FOOD INSECURITY: WITHIN THE PAST 12 MONTHS, THE FOOD YOU BOUGHT JUST DIDN'T LAST AND YOU DIDN'T HAVE MONEY TO GET MORE.: NEVER TRUE

## 2024-06-16 SDOH — ECONOMIC STABILITY: INCOME INSECURITY: HOW HARD IS IT FOR YOU TO PAY FOR THE VERY BASICS LIKE FOOD, HOUSING, MEDICAL CARE, AND HEATING?: NOT VERY HARD

## 2024-06-16 SDOH — ECONOMIC STABILITY: FOOD INSECURITY: WITHIN THE PAST 12 MONTHS, YOU WORRIED THAT YOUR FOOD WOULD RUN OUT BEFORE YOU GOT MONEY TO BUY MORE.: NEVER TRUE

## 2024-06-16 SDOH — ECONOMIC STABILITY: TRANSPORTATION INSECURITY
IN THE PAST 12 MONTHS, HAS LACK OF TRANSPORTATION KEPT YOU FROM MEETINGS, WORK, OR FROM GETTING THINGS NEEDED FOR DAILY LIVING?: NO

## 2024-06-16 SDOH — ECONOMIC STABILITY: HOUSING INSECURITY
IN THE LAST 12 MONTHS, WAS THERE A TIME WHEN YOU DID NOT HAVE A STEADY PLACE TO SLEEP OR SLEPT IN A SHELTER (INCLUDING NOW)?: NO

## 2024-06-19 ENCOUNTER — OFFICE VISIT (OUTPATIENT)
Facility: CLINIC | Age: 39
End: 2024-06-19
Payer: COMMERCIAL

## 2024-06-19 VITALS
TEMPERATURE: 98.2 F | BODY MASS INDEX: 49.94 KG/M2 | RESPIRATION RATE: 17 BRPM | OXYGEN SATURATION: 99 % | HEIGHT: 62 IN | DIASTOLIC BLOOD PRESSURE: 72 MMHG | HEART RATE: 84 BPM | WEIGHT: 271.4 LBS | SYSTOLIC BLOOD PRESSURE: 111 MMHG

## 2024-06-19 DIAGNOSIS — R92.8 ABNORMAL MAMMOGRAM: ICD-10-CM

## 2024-06-19 DIAGNOSIS — R61 DIAPHORESIS: ICD-10-CM

## 2024-06-19 DIAGNOSIS — D25.9 UTERINE LEIOMYOMA, UNSPECIFIED LOCATION: ICD-10-CM

## 2024-06-19 DIAGNOSIS — R10.30 LOWER ABDOMINAL PAIN: ICD-10-CM

## 2024-06-19 DIAGNOSIS — F90.9 ATTENTION DEFICIT HYPERACTIVITY DISORDER (ADHD), UNSPECIFIED ADHD TYPE: Primary | ICD-10-CM

## 2024-06-19 DIAGNOSIS — D75.839 THROMBOCYTOSIS: ICD-10-CM

## 2024-06-19 DIAGNOSIS — Z13.220 SCREENING FOR HYPERLIPIDEMIA: ICD-10-CM

## 2024-06-19 DIAGNOSIS — R73.03 PREDIABETES: ICD-10-CM

## 2024-06-19 DIAGNOSIS — R14.0 BLOATING: ICD-10-CM

## 2024-06-19 DIAGNOSIS — N63.20 MASS OF LEFT BREAST, UNSPECIFIED QUADRANT: ICD-10-CM

## 2024-06-19 PROCEDURE — 99214 OFFICE O/P EST MOD 30 MIN: CPT | Performed by: INTERNAL MEDICINE

## 2024-06-19 PROCEDURE — G8417 CALC BMI ABV UP PARAM F/U: HCPCS | Performed by: INTERNAL MEDICINE

## 2024-06-19 PROCEDURE — 1036F TOBACCO NON-USER: CPT | Performed by: INTERNAL MEDICINE

## 2024-06-19 PROCEDURE — G8427 DOCREV CUR MEDS BY ELIG CLIN: HCPCS | Performed by: INTERNAL MEDICINE

## 2024-06-19 NOTE — PROGRESS NOTES
INTERNISTS OF [facility]:  6/25/2024, MRN: [id number]      Deepthi Hagan is a 39 y.o. female and presents to clinic for Follow-up (6 wks f/u), ADHD (6 wks f/u), and prediabes (6 wks f/u)      Subjective:   She is a 38yo female with h/o thrombocytosis (followed by [facility]- ), previous positive PPD, Covid-19,  prediabetes, uterine fibroids, IMAN (on CPAP), Glaucoma (followed by ), microscopic hematuria (evaluated by [facility]), asthma, ADHD per neuropsychological testing (8/24/22), anxiety, allergic rhinitis, HLD, and obesity.     1. ADHD: She is doing talk therapy every 2 wks. No improvement with Strattera 40mg daily x 1 month. She thus stopped taking it. She tested positive for ADHD via neuropsychological testing. No depression. +Stress. Stress is unchanged. No sleeping issues. Using her CPAP.  She declines rx for ADHD today.     2. Abnormal Mammogram and Breast Pain (Atypical): She has an area along her left breast that is increasing in size. She has a fullness/heaviness along this breast.    3/6/24 Mammogram: Probably benign right breast masses.  Probably benign left breast focal asymmetry.  BI-RADS CATEGORY: BI-RADS 3: Probably Benign FOLLOW-UP RECOMMENDATIONS: Bilateral Diagnostic Mammogram and Ultrasound in 6 Months     3. Thrombocytosis: She saw Hematology in 2021. She is having diaphoresis episodes. She has irregular flow to her monthly menses.  She is scheduled with Hematology at [facility] in July.  She has bloating. She saw her GYN team in April. +H/o uterine fibroids. She had a transvaginal ultrasound earlier this yr.     4. Lower Abdominal Pain: She reports residual abdominal pain, associated with bloating symptoms.  No bleeding history.  She has a history of uterine fibroids.  No nausea/vomiting.  No triggers are known for her abdominal pain.    5.  Prediabetes: Her insurance a lot of covers Rybelsus in the absence of type 2 diabetes.  Her last A1c was over 6

## 2024-06-19 NOTE — PROGRESS NOTES
Deepthi Hagan presents today for   Chief Complaint   Patient presents with    Follow-up     6 wks f/u    ADHD     6 wks f/u    prediabes     6 wks f/u           \"Have you been to the ER, urgent care clinic since your last visit?  Hospitalized since your last visit?\"    NO    “Have you seen or consulted any other health care providers outside of [facility] since your last visit?”    NO

## 2024-06-25 ASSESSMENT — ENCOUNTER SYMPTOMS
BLOOD IN STOOL: 0
ANAL BLEEDING: 0
COUGH: 0
ABDOMINAL PAIN: 1
EYE PAIN: 0
SHORTNESS OF BREATH: 0
SORE THROAT: 0

## 2024-06-27 ENCOUNTER — HOSPITAL ENCOUNTER (OUTPATIENT)
Facility: HOSPITAL | Age: 39
Discharge: HOME | End: 2024-06-27
Attending: INTERNAL MEDICINE
Payer: COMMERCIAL

## 2024-06-27 DIAGNOSIS — D75.839 THROMBOCYTOSIS: ICD-10-CM

## 2024-06-27 DIAGNOSIS — R14.0 BLOATING: ICD-10-CM

## 2024-06-27 DIAGNOSIS — R10.30 LOWER ABDOMINAL PAIN: ICD-10-CM

## 2024-06-27 DIAGNOSIS — R61 DIAPHORESIS: ICD-10-CM

## 2024-06-27 LAB — CREAT UR-MCNC: 0.9 MG/DL (ref 0.6–1.3)

## 2024-06-27 PROCEDURE — 6360000004 HC RX CONTRAST MEDICATION: Performed by: INTERNAL MEDICINE

## 2024-06-27 PROCEDURE — 82565 ASSAY OF CREATININE: CPT

## 2024-06-27 PROCEDURE — 74177 CT ABD & PELVIS W/CONTRAST: CPT

## 2024-06-27 RX ADMIN — DIATRIZOATE MEGLUMINE AND DIATRIZOATE SODIUM 30 ML: 660; 100 LIQUID ORAL; RECTAL at 18:21

## 2024-06-27 RX ADMIN — IOPAMIDOL 100 ML: 612 INJECTION, SOLUTION INTRAVENOUS at 20:07

## 2024-07-01 ENCOUNTER — PATIENT MESSAGE (OUTPATIENT)
Facility: CLINIC | Age: 39
End: 2024-07-01

## 2024-07-01 ENCOUNTER — TELEPHONE (OUTPATIENT)
Facility: CLINIC | Age: 39
End: 2024-07-01

## 2024-07-01 NOTE — TELEPHONE ENCOUNTER
----- Message from Deepthi Hagan sent at 7/1/2024 12:45 PM EDT -----  Regarding: Additional blood tests   Contact: [phone]  Wilda Orr, I would like to have additional hormone    blood work done.  Please let me know if this is possible.  Thank you.

## 2024-07-02 ENCOUNTER — TELEPHONE (OUTPATIENT)
Facility: CLINIC | Age: 39
End: 2024-07-02

## 2024-07-02 NOTE — TELEPHONE ENCOUNTER
----- Message from Edyta Orr MD sent at 7/1/2024  5:58 PM EDT -----  She sees  with Gynecology. Please forward her scan results to this physician. I discussed her results with her and encouraged her to schedule an apt with  for evaluation of her uterine findings.    Dr. Edyta Orr  [facility] of [address], [address]  [place] [address]  Phone: [phone]  Fax: [phone]

## 2024-07-16 NOTE — PROGRESS NOTES
Deepthi Hagan is a 39 y.o. female (: 1985)     Chief Complaint   Patient presents with    New Patient     Bilateral breast       Medication list and allergies have been reviewed with Deepthi Hagan and updated as of today's date.     I have gone over all Medical, Surgical and Social History with Deepthi Hagan and updated/added the information accordingly.

## 2024-07-17 ENCOUNTER — OFFICE VISIT (OUTPATIENT)
Age: 39
End: 2024-07-17
Payer: COMMERCIAL

## 2024-07-17 VITALS
RESPIRATION RATE: 14 BRPM | SYSTOLIC BLOOD PRESSURE: 132 MMHG | HEIGHT: 62 IN | DIASTOLIC BLOOD PRESSURE: 78 MMHG | HEART RATE: 101 BPM | OXYGEN SATURATION: 100 % | WEIGHT: 270 LBS | TEMPERATURE: 96.9 F | BODY MASS INDEX: 49.69 KG/M2

## 2024-07-17 DIAGNOSIS — R92.8 ABNORMAL MAMMOGRAM OF BOTH BREASTS: Primary | ICD-10-CM

## 2024-07-17 DIAGNOSIS — N64.4 BREAST PAIN, LEFT: ICD-10-CM

## 2024-07-17 PROCEDURE — 99204 OFFICE O/P NEW MOD 45 MIN: CPT | Performed by: SURGERY

## 2024-07-17 PROCEDURE — G8427 DOCREV CUR MEDS BY ELIG CLIN: HCPCS | Performed by: SURGERY

## 2024-07-17 PROCEDURE — G8417 CALC BMI ABV UP PARAM F/U: HCPCS | Performed by: SURGERY

## 2024-07-17 PROCEDURE — 1036F TOBACCO NON-USER: CPT | Performed by: SURGERY

## 2024-07-28 LAB
A/G RATIO: 1.1 RATIO (ref 1.1–2.6)
ALBUMIN: 4.2 G/DL (ref 3.5–5)
ALP BLD-CCNC: 56 U/L (ref 25–115)
ALT SERPL-CCNC: 17 U/L (ref 5–40)
ANION GAP SERPL CALCULATED.3IONS-SCNC: 12 MMOL/L (ref 3–15)
AST SERPL-CCNC: 14 U/L (ref 10–37)
BASOPHILS ABSOLUTE: 0 K/UL (ref 0–0.2)
BASOPHILS RELATIVE PERCENT: 1 % (ref 0–2)
BILIRUB SERPL-MCNC: 0.2 MG/DL (ref 0.2–1.2)
BUN BLDV-MCNC: 14 MG/DL (ref 6–22)
CALCIUM SERPL-MCNC: 9.3 MG/DL (ref 8.4–10.5)
CHLORIDE BLD-SCNC: 102 MMOL/L (ref 98–110)
CHOLESTEROL, TOTAL: 220 MG/DL (ref 110–200)
CHOLESTEROL/HDL RATIO: 3.1 (ref 0–5)
CO2: 26 MMOL/L (ref 20–32)
CREAT SERPL-MCNC: 0.8 MG/DL (ref 0.5–1.2)
EOSINOPHIL # BLD: 2 % (ref 0–6)
EOSINOPHILS ABSOLUTE: 0.1 K/UL (ref 0–0.5)
ESTIMATED AVERAGE GLUCOSE: 111 MG/DL (ref 91–123)
GFR, ESTIMATED: >60 ML/MIN/1.73 SQ.M.
GLOBULIN: 3.9 G/DL (ref 2–4)
GLUCOSE: 99 MG/DL (ref 70–99)
HBA1C MFR BLD: 5.5 % (ref 4.8–5.6)
HCT VFR BLD CALC: 38.6 % (ref 35.1–46.5)
HDLC SERPL-MCNC: 70 MG/DL
HEMOGLOBIN: 12.7 G/DL (ref 11.7–15.5)
LDL CHOLESTEROL: 140 MG/DL (ref 50–99)
LDL/HDL RATIO: 2
LYMPHOCYTES # BLD: 39 % (ref 20–45)
LYMPHOCYTES ABSOLUTE: 2.5 K/UL (ref 1–4.8)
MCH RBC QN AUTO: 28 PG (ref 26–34)
MCHC RBC AUTO-ENTMCNC: 33 G/DL (ref 31–36)
MCV RBC AUTO: 86 FL (ref 80–99)
MONOCYTES ABSOLUTE: 0.6 K/UL (ref 0.1–1)
MONOCYTES: 10 % (ref 3–12)
NEUTROPHILS ABSOLUTE: 3.2 K/UL (ref 1.8–7.7)
NEUTROPHILS: 49 % (ref 40–75)
NON-HDL CHOLESTEROL: 150 MG/DL
PDW BLD-RTO: 12.5 % (ref 10–15.5)
PLATELET # BLD: 480 K/UL (ref 140–440)
PMV BLD AUTO: 9.9 FL (ref 9–13)
POTASSIUM SERPL-SCNC: 4.1 MMOL/L (ref 3.5–5.5)
RBC # BLD: 4.48 M/UL (ref 3.8–5.2)
SODIUM BLD-SCNC: 140 MMOL/L (ref 133–145)
T4 FREE: 1.4 NG/DL (ref 0.9–1.8)
TOTAL PROTEIN: 8.1 G/DL (ref 6.4–8.3)
TRIGL SERPL-MCNC: 50 MG/DL (ref 40–149)
TSH SERPL DL<=0.05 MIU/L-ACNC: 0.97 MCU/ML (ref 0.27–4.2)
VLDLC SERPL CALC-MCNC: 10 MG/DL (ref 8–30)
WBC # BLD: 6.4 K/UL (ref 4–11)

## 2024-07-29 ENCOUNTER — HOSPITAL ENCOUNTER (OUTPATIENT)
Facility: HOSPITAL | Age: 39
Discharge: HOME | End: 2024-08-01
Attending: INTERNAL MEDICINE
Payer: COMMERCIAL

## 2024-07-29 ENCOUNTER — OFFICE VISIT (OUTPATIENT)
Facility: CLINIC | Age: 39
End: 2024-07-29
Payer: COMMERCIAL

## 2024-07-29 VITALS
HEART RATE: 85 BPM | OXYGEN SATURATION: 100 % | RESPIRATION RATE: 17 BRPM | TEMPERATURE: 98.4 F | HEIGHT: 62 IN | SYSTOLIC BLOOD PRESSURE: 136 MMHG | DIASTOLIC BLOOD PRESSURE: 77 MMHG | BODY MASS INDEX: 49.65 KG/M2 | WEIGHT: 269.8 LBS

## 2024-07-29 VITALS — BODY MASS INDEX: 49.69 KG/M2 | WEIGHT: 270 LBS | HEIGHT: 62 IN

## 2024-07-29 DIAGNOSIS — R92.8 ABNORMAL MAMMOGRAM OF BOTH BREASTS: ICD-10-CM

## 2024-07-29 DIAGNOSIS — N63.10 MASS OF BOTH BREASTS ON MAMMOGRAM: ICD-10-CM

## 2024-07-29 DIAGNOSIS — F90.9 ATTENTION DEFICIT HYPERACTIVITY DISORDER (ADHD), UNSPECIFIED ADHD TYPE: Primary | ICD-10-CM

## 2024-07-29 DIAGNOSIS — E78.5 HYPERLIPIDEMIA, UNSPECIFIED HYPERLIPIDEMIA TYPE: ICD-10-CM

## 2024-07-29 DIAGNOSIS — R92.8 ABNORMAL MAMMOGRAM: ICD-10-CM

## 2024-07-29 DIAGNOSIS — N63.20 MASS OF BOTH BREASTS ON MAMMOGRAM: ICD-10-CM

## 2024-07-29 DIAGNOSIS — G47.33 OSA (OBSTRUCTIVE SLEEP APNEA): ICD-10-CM

## 2024-07-29 DIAGNOSIS — D75.839 THROMBOCYTOSIS: ICD-10-CM

## 2024-07-29 DIAGNOSIS — R73.03 PREDIABETES: ICD-10-CM

## 2024-07-29 DIAGNOSIS — R61 DIAPHORESIS: ICD-10-CM

## 2024-07-29 PROCEDURE — 76642 ULTRASOUND BREAST LIMITED: CPT

## 2024-07-29 PROCEDURE — G8417 CALC BMI ABV UP PARAM F/U: HCPCS | Performed by: INTERNAL MEDICINE

## 2024-07-29 PROCEDURE — G0279 TOMOSYNTHESIS, MAMMO: HCPCS

## 2024-07-29 PROCEDURE — G8427 DOCREV CUR MEDS BY ELIG CLIN: HCPCS | Performed by: INTERNAL MEDICINE

## 2024-07-29 PROCEDURE — 1036F TOBACCO NON-USER: CPT | Performed by: INTERNAL MEDICINE

## 2024-07-29 PROCEDURE — 99214 OFFICE O/P EST MOD 30 MIN: CPT | Performed by: INTERNAL MEDICINE

## 2024-07-29 RX ORDER — DEXTROAMPHETAMINE SACCHARATE, AMPHETAMINE ASPARTATE, DEXTROAMPHETAMINE SULFATE AND AMPHETAMINE SULFATE 1.25; 1.25; 1.25; 1.25 MG/1; MG/1; MG/1; MG/1
5 TABLET ORAL DAILY
Qty: 30 TABLET | Refills: 0 | Status: SHIPPED | OUTPATIENT
Start: 2024-07-29 | End: 2025-01-25

## 2024-07-29 NOTE — PROGRESS NOTES
Deepthi Hagan presents today for No chief complaint on file.          \"Have you been to the ER, urgent care clinic since your last visit?  Hospitalized since your last visit?\"    NO    “Have you seen or consulted any other health care providers outside of [facility] since your last visit?”    NO

## 2024-07-29 NOTE — PROGRESS NOTES
INTERNISTS OF [facility]:  7/30/2024, MRN: [id number]      Deepthi Hagan is a 39 y.o. female and presents to clinic for Follow-up      Subjective:   She is a 40yo female with h/o thrombocytosis (followed by [facility]- ), previous positive PPD, Covid-19,  prediabetes, uterine fibroids, IMAN (on CPAP), Glaucoma (followed by ), microscopic hematuria (evaluated by [facility]), asthma, ADHD per neuropsychological testing (8/24/22), anxiety, allergic rhinitis, HLD, and obesity.     1.  Abnormal mammogram: At her last appointment, she reported a left breast lump that was increasing in size.  A referral was placed to breast surgery for evaluation.  A left breast mammogram and ultrasound were ordered.  It was done today.  Results are pending. No breast pain. We will do a preventative visit when she returns.     7/17/14 Breast Surgery Note: \"I reviewed with her diagnostic imaging of the bilateral breasts from 3/2024 noting asymmetry of the left and benign appearing masses on the right with short interval follow up recommended. With increasing pain and swelling of the left breast I recommend we obtain diagnostic imaging now.\"    2.  ADHD: At her last visit she was doing talk therapy at 2-week intervals.  There was no improvement in her symptoms with Strattera 40 mg daily for 1 month.  As result she stopped taking it in the past.  She tested positive for ADHD via neuropsychological testing.  At her last visit she reported some stress but declined additional ADHD medication. She is no longer participates in talk therapy.  She is in school to be a bachelor's degree.    3.  IMAN: Using her CPAP.  No issues.    4.  Thrombocytosis: At her last visit she reported diaphoretic episodes and bloating.  She reported monthly menses that had an irregular flow pattern.  She has a history of uterine fibroids and had a transvaginal ultrasound within the past year.  Labs were ordered at her last visit

## 2024-07-30 ASSESSMENT — ENCOUNTER SYMPTOMS
COUGH: 0
BLOOD IN STOOL: 0
SHORTNESS OF BREATH: 0
ABDOMINAL PAIN: 0
EYE PAIN: 0
SORE THROAT: 0
ANAL BLEEDING: 0

## 2024-08-19 DIAGNOSIS — Z11.1 SCREENING EXAMINATION FOR PULMONARY TUBERCULOSIS: Primary | ICD-10-CM

## 2024-09-28 ENCOUNTER — HOSPITAL ENCOUNTER (OUTPATIENT)
Facility: HOSPITAL | Age: 39
Discharge: HOME | End: 2024-10-01
Payer: COMMERCIAL

## 2024-09-28 DIAGNOSIS — Z11.1 SCREENING EXAMINATION FOR PULMONARY TUBERCULOSIS: ICD-10-CM

## 2024-09-28 PROCEDURE — 71046 X-RAY EXAM CHEST 2 VIEWS: CPT

## 2024-09-30 ENCOUNTER — TELEPHONE (OUTPATIENT)
Facility: CLINIC | Age: 39
End: 2024-09-30

## 2024-09-30 ENCOUNTER — OFFICE VISIT (OUTPATIENT)
Facility: CLINIC | Age: 39
End: 2024-09-30
Payer: COMMERCIAL

## 2024-09-30 VITALS
WEIGHT: 272 LBS | OXYGEN SATURATION: 94 % | BODY MASS INDEX: 50.05 KG/M2 | RESPIRATION RATE: 18 BRPM | SYSTOLIC BLOOD PRESSURE: 114 MMHG | HEIGHT: 62 IN | HEART RATE: 93 BPM | TEMPERATURE: 97.6 F | DIASTOLIC BLOOD PRESSURE: 71 MMHG

## 2024-09-30 DIAGNOSIS — F90.9 ATTENTION DEFICIT HYPERACTIVITY DISORDER (ADHD), UNSPECIFIED ADHD TYPE: ICD-10-CM

## 2024-09-30 DIAGNOSIS — R92.8 ABNORMAL MAMMOGRAM OF BOTH BREASTS: ICD-10-CM

## 2024-09-30 DIAGNOSIS — R73.03 PREDIABETES: ICD-10-CM

## 2024-09-30 DIAGNOSIS — Z00.00 ROUTINE GENERAL MEDICAL EXAMINATION AT A HEALTH CARE FACILITY: Primary | ICD-10-CM

## 2024-09-30 PROCEDURE — 99396 PREV VISIT EST AGE 40-64: CPT | Performed by: INTERNAL MEDICINE

## 2024-09-30 RX ORDER — DEXTROAMPHETAMINE SACCHARATE, AMPHETAMINE ASPARTATE MONOHYDRATE, DEXTROAMPHETAMINE SULFATE AND AMPHETAMINE SULFATE 2.5; 2.5; 2.5; 2.5 MG/1; MG/1; MG/1; MG/1
10 CAPSULE, EXTENDED RELEASE ORAL DAILY
Qty: 30 CAPSULE | Refills: 0 | Status: SHIPPED | OUTPATIENT
Start: 2024-09-30 | End: 2024-09-30 | Stop reason: SDUPTHER

## 2024-09-30 RX ORDER — DEXTROAMPHETAMINE SACCHARATE, AMPHETAMINE ASPARTATE MONOHYDRATE, DEXTROAMPHETAMINE SULFATE AND AMPHETAMINE SULFATE 2.5; 2.5; 2.5; 2.5 MG/1; MG/1; MG/1; MG/1
10 CAPSULE, EXTENDED RELEASE ORAL DAILY
Qty: 30 CAPSULE | Refills: 0 | Status: SHIPPED | OUTPATIENT
Start: 2024-09-30 | End: 2025-03-29

## 2024-09-30 ASSESSMENT — ENCOUNTER SYMPTOMS
BLOOD IN STOOL: 0
ANAL BLEEDING: 0
SORE THROAT: 0
COUGH: 0
EYE PAIN: 0
SHORTNESS OF BREATH: 0
ABDOMINAL PAIN: 0

## 2024-09-30 NOTE — PROGRESS NOTES
Deepthi Hagan presents today for   Chief Complaint   Patient presents with    Follow-up     7 wks f/u    ADHD     7 wks f/u           \"Have you been to the ER, urgent care clinic since your last visit?  Hospitalized since your last visit?\"    NO    “Have you seen or consulted any other health care providers outside of [facility] since your last visit?”    NO           .  
     General: No swelling (BUE/BLE) or tenderness (BUE/BLE).      Cervical back: Neck supple.      Comments: No effusions are present.  She has a negative bilateral Francine test.  Her back exam is unremarkable.   Lymphadenopathy:      Cervical: No cervical adenopathy.   Skin:     General: Skin is warm and dry.      Findings: No erythema.   Neurological:      Mental Status: She is alert. Mental status is at baseline.      Gait: Gait normal.   Psychiatric:         Mood and Affect: Mood normal.           LABS   Data Review:   Lab Results   Component Value Date/Time    WBC 6.4 07/27/2024 09:54 AM    HGB 12.7 07/27/2024 09:54 AM    HCT 38.6 07/27/2024 09:54 AM     07/27/2024 09:54 AM    MCV 86 07/27/2024 09:54 AM       Lab Results   Component Value Date/Time     07/27/2024 09:54 AM    K 4.1 07/27/2024 09:54 AM     07/27/2024 09:54 AM    CO2 26 07/27/2024 09:54 AM    BUN 14 07/27/2024 09:54 AM    GFRAA >60 05/28/2022 10:18 AM       Lab Results   Component Value Date/Time    CHOL 220 07/27/2024 09:54 AM    HDL 70 07/27/2024 09:54 AM     07/27/2024 09:54 AM     01/06/2024 11:12 AM    VLDL 10 07/27/2024 09:54 AM       No results found for: \"HBA1C\", \"[id number]\"    Assessment/Plan:   1. Abnormal mammogram of both breasts: Asymptomatic.  -Ordering follow-up imaging for the end of January (mammogram and ultrasound) per radiology recommendations.    2. Routine general medical examination at a health care facility  -I encouraged her to work on meal prepping as a means of improving her diet.  I encouraged her to maintain a low-carb diet she will need a follow-up A1c checked after January 30, given her history of prediabetes.  - I encouraged her to get all recommended vaccinations.  - She is up-to-date on her Pap smear.    3. Attention deficit hyperactivity disorder (ADHD): Unaffected with taking Adderall 5 mg daily.  -Increasing her dose from 5 mg daily to 10 mg daily.  - She will notify me if she

## 2024-09-30 NOTE — TELEPHONE ENCOUNTER
----- Message from Dr. Edyta Orr MD sent at 9/30/2024  1:22 PM EDT -----  Please let her know that her chest x-ray is unremarkable.    Dr. Edyta Orr  [facility] of [address], [address]  [place] [address]  Phone: [phone]  Fax: [phone]

## 2024-11-18 ENCOUNTER — OFFICE VISIT (OUTPATIENT)
Facility: CLINIC | Age: 39
End: 2024-11-18

## 2024-11-18 VITALS
SYSTOLIC BLOOD PRESSURE: 136 MMHG | HEART RATE: 96 BPM | DIASTOLIC BLOOD PRESSURE: 61 MMHG | BODY MASS INDEX: 50.42 KG/M2 | TEMPERATURE: 98.7 F | OXYGEN SATURATION: 97 % | HEIGHT: 62 IN | RESPIRATION RATE: 18 BRPM | WEIGHT: 274 LBS

## 2024-11-18 DIAGNOSIS — Z23 NEED FOR IMMUNIZATION AGAINST INFLUENZA: ICD-10-CM

## 2024-11-18 DIAGNOSIS — Z87.898 HISTORY OF PREDIABETES: ICD-10-CM

## 2024-11-18 DIAGNOSIS — F90.9 ATTENTION DEFICIT HYPERACTIVITY DISORDER (ADHD), UNSPECIFIED ADHD TYPE: Primary | ICD-10-CM

## 2024-11-18 DIAGNOSIS — Z13.220 SCREENING FOR HYPERLIPIDEMIA: ICD-10-CM

## 2024-11-18 DIAGNOSIS — R92.8 ABNORMAL MAMMOGRAM OF BOTH BREASTS: ICD-10-CM

## 2024-11-18 NOTE — PROGRESS NOTES
Deepthi Hagan is a 39 y.o. female (: 1985) presenting to address:    Chief Complaint   Patient presents with    Follow-up     7 week follow up        Vitals:    24 0851   BP: 136/61   Pulse: 96   Resp: 18   Temp: 98.7 °F (37.1 °C)   SpO2: 97%       \"Have you been to the ER, urgent care clinic since your last visit?  Hospitalized since your last visit?\"    NO    “Have you seen or consulted any other health care providers outside of [facility] since your last visit?”    NO

## 2024-11-18 NOTE — PROGRESS NOTES
INTERNISTS OF [facility]:  11/25/2024, MRN: [id number]      Deepthi Hagan is a 39 y.o. female and presents to clinic for Follow-up (7 week follow up )      Subjective:   She is a 40yo female with h/o thrombocytosis (followed by [facility]- ), previous positive PPD, Covid-19,  prediabetes, uterine fibroids, IMAN (on CPAP), Glaucoma (followed by ), microscopic hematuria (evaluated by [facility]), asthma, ADHD per neuropsychological testing (8/24/22), anxiety, allergic rhinitis, HLD, and obesity.     1.  ADHD: Not responsive to 5 mg daily of Adderall for her last visit.  She completed talk therapy in the past.  She had no improvement in her symptoms with the use of Strattera 40 mg daily for 1 month.  She was diagnosed after having a positive test for neuropsychological testing.  Blood pressure today: 136/61.  At her last visit I increased her dose of Adderall from 5 mg daily to 10 mg daily.  Today she reports: no improvement in her sx with the higher adderall dose. \"I think I'm done with the medicine.\" Her job performance is \"ok.\" She reported no known side effects. She tried 15mg adderal daily yesterday. She had a \"bad headache\" she attributed to this dose. It resolved.    2.  Health maintenance:  - Abnormal Mammogram: She has a history of an abnormal mammogram per her July study bilaterally.  She has 2 areas that are likely benign  along her right and left breasts.  At her last visit I ordered follow-up imaging for the end of January.  Today she reports: she has yet to schedule her mammogram.  - Prediabetes Hx: She has a history of prediabetes.  She is due for a follow-up A1c to be checked in late January.  - Still using her CPAP, given her IMAN.           Patient Active Problem List    Diagnosis Date Noted    Moderate obstructive sleep apnea 10/20/2023    Class 3 severe obesity due to excess calories without serious comorbidity with body mass index (BMI) of 45.0 to 49.9 in adult

## 2024-11-25 ASSESSMENT — ENCOUNTER SYMPTOMS
BLOOD IN STOOL: 0
EYE PAIN: 0
ANAL BLEEDING: 0
SORE THROAT: 0
COUGH: 0
SHORTNESS OF BREATH: 0
ABDOMINAL PAIN: 0

## 2024-12-04 ENCOUNTER — PATIENT MESSAGE (OUTPATIENT)
Facility: CLINIC | Age: 39
End: 2024-12-04

## 2024-12-05 PROBLEM — D25.9 LEIOMYOMA OF UTERUS, UNSPECIFIED: Status: ACTIVE | Noted: 2024-12-05

## 2024-12-06 NOTE — TELEPHONE ENCOUNTER
I asked her to bring in another form due to not having her original form anymore. I sent her original form be destroyed/shredded by mistake.      Dr. Edyta Orr  [facility] of [address], [address]  [place] [address]  Phone: [phone]  Fax: [phone]

## 2024-12-09 ENCOUNTER — TELEPHONE (OUTPATIENT)
Facility: CLINIC | Age: 39
End: 2024-12-09

## 2024-12-09 NOTE — TELEPHONE ENCOUNTER
Pt dropped off paperwork she asked for  to sign she will  when complete   Original in DR box up front copy in purple folder

## 2024-12-10 NOTE — TELEPHONE ENCOUNTER
Paperwork completed.      .Dr. Edyta Orr  [facility] of [address], [address]  [place] [address]  Phone: [phone]  Fax: [phone]

## 2024-12-11 ENCOUNTER — TELEPHONE (OUTPATIENT)
Facility: CLINIC | Age: 39
End: 2024-12-11

## 2024-12-11 NOTE — TELEPHONE ENCOUNTER
Called pt to notify that TB Risk Assessment form is ready for pickup. No answer. Left VM and sent "[employer]" msg.    GLENN Keane LPN

## 2024-12-20 ENCOUNTER — TELEMEDICINE (OUTPATIENT)
Facility: CLINIC | Age: 39
End: 2024-12-20
Payer: COMMERCIAL

## 2024-12-20 DIAGNOSIS — R07.89 CHEST TIGHTNESS: ICD-10-CM

## 2024-12-20 DIAGNOSIS — I10 HYPERTENSION, UNSPECIFIED TYPE: ICD-10-CM

## 2024-12-20 DIAGNOSIS — R51.9 NONINTRACTABLE HEADACHE, UNSPECIFIED CHRONICITY PATTERN, UNSPECIFIED HEADACHE TYPE: Primary | ICD-10-CM

## 2024-12-20 DIAGNOSIS — J45.20 MILD INTERMITTENT ASTHMA WITHOUT COMPLICATION: ICD-10-CM

## 2024-12-20 PROCEDURE — 99214 OFFICE O/P EST MOD 30 MIN: CPT | Performed by: INTERNAL MEDICINE

## 2024-12-20 PROCEDURE — G8427 DOCREV CUR MEDS BY ELIG CLIN: HCPCS | Performed by: INTERNAL MEDICINE

## 2024-12-20 RX ORDER — CANDESARTAN 8 MG/1
8 TABLET ORAL DAILY
Qty: 30 TABLET | Refills: 3 | Status: SHIPPED | OUTPATIENT
Start: 2024-12-20

## 2024-12-20 RX ORDER — BUTALBITAL, ACETAMINOPHEN AND CAFFEINE 50; 325; 40 MG/1; MG/1; MG/1
1 TABLET ORAL EVERY 4 HOURS PRN
Qty: 30 TABLET | Refills: 3 | Status: SHIPPED | OUTPATIENT
Start: 2024-12-20

## 2024-12-20 NOTE — PROGRESS NOTES
Deepthi Hagan presents today for   Chief Complaint   Patient presents with    Follow-up     12/06; [facility]; Elevated BP           \"Have you been to the ER, urgent care clinic since your last visit?  Hospitalized since your last visit?\"    YES - When: approximately 2  weeks ago.  Where and Why: [facility]; Elevated BP.    “Have you seen or consulted any other health care providers [facility] visit?”    YES - When: approximately 2  weeks ago.  Where and Why: Uterine Fibroid procedure; [facility].             
to this being a TeleHealth encounter (During COVID-19 public health emergency), evaluation of the following organ systems was limited: Vitals/Constitutional/EENT/Resp/CV/GI//MS/Neuro/Skin/Heme-Lymph-Imm.   Pursuant to the emergency declaration under the Harris Act and the National Emergencies Act, 1135 waiver authority and the Coronavirus Preparedness and Response Supplemental Appropriations Act, this Virtual  Visit was conducted, with patient's (and/or legal guardian's) consent, to reduce the patient's risk of exposure to COVID-19 and provide necessary medical care.     Services were provided through a video synchronous discussion virtually to substitute for in-person clinic visit. Patient and provider were located at their individual home/office respectively.      We discussed the expected course, resolution and complications of the diagnosis(es) in detail.  Medication risks, benefits, costs, interactions, and alternatives were discussed as indicated.  I advised her to contact the office if her condition worsens, changes or fails to improve as anticipated. She expressed understanding with the diagnosis(es) and plan.     MD Deepthi Moore, who was evaluated through a synchronous (real-time)  encounter, and/or her healthcare decision maker, is aware that it is a billable service, which includes applicable co-pays, with coverage as determined by her insurance carrier. She provided verbal consent to proceed and patient identification was verified. This visit was conducted pursuant to the emergency declaration under the Harris Act and the National Emergencies Act, 1135 waiver authority and the Coronavirus Preparedness and Response Supplemental Appropriations Act. A caregiver was present when appropriate. Ability to conduct physical exam was limited. The patient was located at: Home: [address] [place], [place] [id number]  The provider was located at: Facility (Appt Dept): [address] [place] [address]

## 2025-01-21 ENCOUNTER — OFFICE VISIT (OUTPATIENT)
Age: 40
End: 2025-01-21
Payer: COMMERCIAL

## 2025-01-21 VITALS
WEIGHT: 272 LBS | HEART RATE: 88 BPM | HEIGHT: 62 IN | OXYGEN SATURATION: 96 % | DIASTOLIC BLOOD PRESSURE: 86 MMHG | SYSTOLIC BLOOD PRESSURE: 138 MMHG | BODY MASS INDEX: 50.05 KG/M2

## 2025-01-21 DIAGNOSIS — G47.33 MODERATE OBSTRUCTIVE SLEEP APNEA: ICD-10-CM

## 2025-01-21 DIAGNOSIS — E66.01 MORBID OBESITY: ICD-10-CM

## 2025-01-21 DIAGNOSIS — R07.89 CHEST TIGHTNESS: Primary | ICD-10-CM

## 2025-01-21 DIAGNOSIS — I10 PRIMARY HYPERTENSION: ICD-10-CM

## 2025-01-21 PROBLEM — E66.813 CLASS 3 SEVERE OBESITY WITH BODY MASS INDEX (BMI) OF 45.0 TO 49.9 IN ADULT: Status: ACTIVE | Noted: 2024-07-12

## 2025-01-21 PROCEDURE — 93000 ELECTROCARDIOGRAM COMPLETE: CPT | Performed by: INTERNAL MEDICINE

## 2025-01-21 PROCEDURE — G8417 CALC BMI ABV UP PARAM F/U: HCPCS | Performed by: INTERNAL MEDICINE

## 2025-01-21 PROCEDURE — 3079F DIAST BP 80-89 MM HG: CPT | Performed by: INTERNAL MEDICINE

## 2025-01-21 PROCEDURE — G8427 DOCREV CUR MEDS BY ELIG CLIN: HCPCS | Performed by: INTERNAL MEDICINE

## 2025-01-21 PROCEDURE — 99204 OFFICE O/P NEW MOD 45 MIN: CPT | Performed by: INTERNAL MEDICINE

## 2025-01-21 PROCEDURE — 1036F TOBACCO NON-USER: CPT | Performed by: INTERNAL MEDICINE

## 2025-01-21 PROCEDURE — 3075F SYST BP GE 130 - 139MM HG: CPT | Performed by: INTERNAL MEDICINE

## 2025-01-21 RX ORDER — HYDROCHLOROTHIAZIDE 12.5 MG/1
12.5 CAPSULE ORAL EVERY MORNING
Qty: 90 CAPSULE | Refills: 3 | Status: SHIPPED | OUTPATIENT
Start: 2025-01-21

## 2025-01-21 ASSESSMENT — ANXIETY QUESTIONNAIRES
1. FEELING NERVOUS, ANXIOUS, OR ON EDGE: NOT AT ALL
2. NOT BEING ABLE TO STOP OR CONTROL WORRYING: NOT AT ALL
7. FEELING AFRAID AS IF SOMETHING AWFUL MIGHT HAPPEN: NOT AT ALL
6. BECOMING EASILY ANNOYED OR IRRITABLE: NOT AT ALL
GAD7 TOTAL SCORE: 0
3. WORRYING TOO MUCH ABOUT DIFFERENT THINGS: NOT AT ALL
4. TROUBLE RELAXING: NOT AT ALL
5. BEING SO RESTLESS THAT IT IS HARD TO SIT STILL: NOT AT ALL

## 2025-01-21 ASSESSMENT — PATIENT HEALTH QUESTIONNAIRE - PHQ9
2. FEELING DOWN, DEPRESSED OR HOPELESS: NOT AT ALL
SUM OF ALL RESPONSES TO PHQ QUESTIONS 1-9: 0
1. LITTLE INTEREST OR PLEASURE IN DOING THINGS: NOT AT ALL
SUM OF ALL RESPONSES TO PHQ QUESTIONS 1-9: 0
SUM OF ALL RESPONSES TO PHQ9 QUESTIONS 1 & 2: 0

## 2025-01-21 ASSESSMENT — ENCOUNTER SYMPTOMS
NAUSEA: 0
VOMITING: 0
CHEST TIGHTNESS: 1
COUGH: 0
ABDOMINAL PAIN: 0
ABDOMINAL DISTENTION: 0
SHORTNESS OF BREATH: 0
SORE THROAT: 0

## 2025-01-21 NOTE — PROGRESS NOTES
01/21/25     eDepthi Hagan  is a 39 y.o. female     Chief Complaint   Patient presents with    New Patient     Referred by pcp for chest tightness       HPI    Patient presents for new office visit.  She was referred here by her PCP for evaluation of chest tightness.  She states she was recently diagnosed with hypertension during an ER visit last month and was started on blood pressure medications with an ARB.  Since starting the medication her blood pressure has been better but still not optimal.  She has noted midsternal chest tightness on and off over the past several months which only occurs at rest and is more noticeable when she is sitting up or looking down with her head.  The symptoms typically resolve after 10 minutes on their own.  No associated shortness of breath, diaphoresis, nausea, vomiting or dizziness.  She has not noted any exertional chest tightness or exertional chest pressure.  No shortness of breath, no leg swelling, no orthopnea or PND.  She does have a history of sleep apnea and is compliant with her CPAP.  She reports a gradual weight gain over many years but no recent weight gain.       Past Medical History:   Diagnosis Date    ADHD     Arthritis 12/6/13    R ankle    Asthma     Glaucoma     Hirsutism     Microhematuria     Mixed hyperlipidemia     Obesity     IMAN on CPAP     nightly    Positive PPD 2006    rx'd with [facility]     Current Outpatient Medications   Medication Sig Dispense Refill    hydroCHLOROthiazide 12.5 MG capsule Take 1 capsule by mouth every morning 90 capsule 3    candesartan (ATACAND) 8 MG tablet Take 1 tablet by mouth daily 30 tablet 3    butalbital-acetaminophen-caffeine (FIORICET, ESGIC) -40 MG per tablet Take 1 tablet by mouth every 4 hours as needed for Headaches 30 tablet 3    LUMIGAN 0.01 % SOLN ophthalmic drops instill 1 drop into both eyes once daily at bedtime      brimonidine-timolol (COMBIGAN) 0.2-0.5 % ophthalmic solution ceived the following from [employer]

## 2025-01-21 NOTE — PROGRESS NOTES
Deepthi Hagan presents today for   Chief Complaint   Patient presents with    New Patient     Referred by pcp for chest tightness       Deepthi Hagan preferred language for health care discussion is english/other.    Is someone accompanying this pt? no    Is the patient using any DME equipment during OV? no    Depression Screening:  Depression: Not at risk (1/21/2025)    PHQ-2     PHQ-2 Score: 0        Learning Assessment:  Who is the primary learner? Patient    What is the preferred language for health care of the primary learner? ENGLISH    How does the primary learner prefer to learn new concepts? DEMONSTRATION    Answered By patient    Relationship to Learner SELF           Pt currently taking Anticoagulant therapy? no    Pt currently taking Antiplatelet therapy ? no      Coordination of Care:  1. Have you been to the ER, urgent care clinic since your last visit? Hospitalized since your last visit? no    2. Have you seen or consulted any other health care providers outside of the [facility] since your last visit? Include any pap smears or colon screening. no

## 2025-01-31 ENCOUNTER — HOSPITAL ENCOUNTER (OUTPATIENT)
Facility: HOSPITAL | Age: 40
Discharge: HOME | End: 2025-01-31
Attending: INTERNAL MEDICINE
Payer: COMMERCIAL

## 2025-01-31 ENCOUNTER — HOSPITAL ENCOUNTER (OUTPATIENT)
Facility: HOSPITAL | Age: 40
End: 2025-01-31
Attending: INTERNAL MEDICINE
Payer: COMMERCIAL

## 2025-01-31 VITALS — WEIGHT: 272 LBS | BODY MASS INDEX: 50.05 KG/M2 | HEIGHT: 62 IN

## 2025-01-31 DIAGNOSIS — R92.8 ABNORMAL MAMMOGRAM OF BOTH BREASTS: ICD-10-CM

## 2025-01-31 PROCEDURE — G0279 TOMOSYNTHESIS, MAMMO: HCPCS

## 2025-01-31 PROCEDURE — 77066 DX MAMMO INCL CAD BI: CPT

## 2025-01-31 PROCEDURE — 76642 ULTRASOUND BREAST LIMITED: CPT

## 2025-02-01 LAB
ESTIMATED AVERAGE GLUCOSE: 116 MG/DL (ref 91–123)
HBA1C MFR BLD: 5.7 % (ref 4.8–5.6)
T4 FREE: 1.2 NG/DL (ref 0.9–1.8)
TSH SERPL DL<=0.05 MIU/L-ACNC: 0.59 MCU/ML (ref 0.27–4.2)

## 2025-02-05 ENCOUNTER — OFFICE VISIT (OUTPATIENT)
Facility: CLINIC | Age: 40
End: 2025-02-05
Payer: COMMERCIAL

## 2025-02-05 VITALS
HEIGHT: 62 IN | OXYGEN SATURATION: 98 % | SYSTOLIC BLOOD PRESSURE: 115 MMHG | DIASTOLIC BLOOD PRESSURE: 72 MMHG | WEIGHT: 272 LBS | HEART RATE: 113 BPM | TEMPERATURE: 97.2 F | RESPIRATION RATE: 20 BRPM | BODY MASS INDEX: 50.05 KG/M2

## 2025-02-05 DIAGNOSIS — R51.9 NONINTRACTABLE HEADACHE, UNSPECIFIED CHRONICITY PATTERN, UNSPECIFIED HEADACHE TYPE: ICD-10-CM

## 2025-02-05 DIAGNOSIS — J45.20 MILD INTERMITTENT ASTHMA WITHOUT COMPLICATION: ICD-10-CM

## 2025-02-05 DIAGNOSIS — Z12.31 ENCOUNTER FOR SCREENING MAMMOGRAM FOR BREAST CANCER: ICD-10-CM

## 2025-02-05 DIAGNOSIS — I10 HYPERTENSION, UNSPECIFIED TYPE: Primary | ICD-10-CM

## 2025-02-05 DIAGNOSIS — R92.8 ABNORMAL MAMMOGRAM OF BOTH BREASTS: ICD-10-CM

## 2025-02-05 DIAGNOSIS — R07.89 CHEST TIGHTNESS: ICD-10-CM

## 2025-02-05 PROCEDURE — G8417 CALC BMI ABV UP PARAM F/U: HCPCS | Performed by: INTERNAL MEDICINE

## 2025-02-05 PROCEDURE — G8427 DOCREV CUR MEDS BY ELIG CLIN: HCPCS | Performed by: INTERNAL MEDICINE

## 2025-02-05 PROCEDURE — 3078F DIAST BP <80 MM HG: CPT | Performed by: INTERNAL MEDICINE

## 2025-02-05 PROCEDURE — 99214 OFFICE O/P EST MOD 30 MIN: CPT | Performed by: INTERNAL MEDICINE

## 2025-02-05 PROCEDURE — 1036F TOBACCO NON-USER: CPT | Performed by: INTERNAL MEDICINE

## 2025-02-05 PROCEDURE — 3074F SYST BP LT 130 MM HG: CPT | Performed by: INTERNAL MEDICINE

## 2025-02-05 RX ORDER — LOSARTAN POTASSIUM 100 MG/1
100 TABLET ORAL DAILY
Qty: 90 TABLET | Refills: 1 | Status: SHIPPED | OUTPATIENT
Start: 2025-02-05

## 2025-02-05 NOTE — PROGRESS NOTES
Deepthi Hagan presents today for   Chief Complaint   Patient presents with    Follow-up           \"Have you been to the ER, urgent care clinic since your last visit?  Hospitalized since your last visit?\"    NO    “Have you seen or consulted any other health care providers outside of [facility] since your last visit?”    NO            
office or report to the ED for continued care.  Greater than 50% of the visit time was spent in counseling and/or coordination of care.      Voice recognition was used to generate this report, which may have resulted in some phonetic based errors in grammar and contents. Even though attempts were made to correct all the mistakes, some may have been missed, and remained in the body of the document.      No follow-up provider specified.    Edyta Orr MD

## 2025-02-06 LAB
METANEPHRINES, FREE: <25 PG/ML
METANEPHRINES, TOTAL, FREE (MN + NMN): 56 PG/ML
NORMETANEPHRINES, FREE: 56 PG/ML

## 2025-02-11 PROBLEM — E66.813 CLASS 3 SEVERE OBESITY DUE TO EXCESS CALORIES WITHOUT SERIOUS COMORBIDITY WITH BODY MASS INDEX (BMI) OF 45.0 TO 49.9 IN ADULT: Status: RESOLVED | Noted: 2023-10-20 | Resolved: 2025-02-11

## 2025-02-11 PROBLEM — E66.01 CLASS 3 SEVERE OBESITY DUE TO EXCESS CALORIES WITHOUT SERIOUS COMORBIDITY WITH BODY MASS INDEX (BMI) OF 45.0 TO 49.9 IN ADULT: Status: RESOLVED | Noted: 2023-10-20 | Resolved: 2025-02-11

## 2025-02-11 ASSESSMENT — ENCOUNTER SYMPTOMS
BLOOD IN STOOL: 0
EYE PAIN: 0
ANAL BLEEDING: 0
COUGH: 0
SHORTNESS OF BREATH: 0
ABDOMINAL PAIN: 0
SORE THROAT: 0

## 2025-02-20 NOTE — PROGRESS NOTES
[facility]  Pulmonary, Critical Care, and Sleep Medicine    Office Progress Note      Primary Care Physician: Edyta Orr MD     Reason for Visit:  Follow up Obstructive Sleep Apnea on cpap  Assessment:  1. Moderate obstructive sleep apnea  Assessment & Plan:   Chronic, at goal (stable), continue current treatment plan, is now using an AirFit P30i nasal pillows mask, which she likes better. No specific complaints with respect to sleep today. Is getting benefit from CPAP use.    In reviewing her download data from her CPAP machine over the past 90 days, her compliance for 4 hours usage or more is 99%.  Her residual sleep apnea is low at 0.4 and she is using her machine, on average, 7-1/2 hours per night.    Her settings appear to be appropriate so we will continue those.    Her Bland score is a little higher than it has been in the past but she said that is \"situational\" and I suspect also because she was taken off her ADHD medications.    I will place an/order for supplies and we will plan on seeing her back in 1 year if she continues to do well  Orders:  -     DME - DURABLE MEDICAL EQUIPMENT  2. Attention deficit hyperactivity disorder (ADHD), unspecified ADHD type  Assessment & Plan:   Not currently being treated  3. Prediabetes  4. Essential hypertension  Assessment & Plan:   Chronic, at goal (stable), continue current treatment plan       Today:    Patient reports she is doing well.    No complaints today.    She continues to due very well with PAP therapy and reports on going clinical benefit.    No aerophagia, or bloating    Mask- No skin irritation    She is using a nasal mask    She is able to get her supplies without issue    PAP Hygiene: Cleans at least once weekly with soap and water      Last seen in office on 2/23/24    Prior summary:    Patient reports she is doing well.  Complaints today: Mask leaking and sometimes her mouth is dry in the morning.  I pointed out

## 2025-02-21 ENCOUNTER — TELEPHONE (OUTPATIENT)
Age: 40
End: 2025-02-21

## 2025-02-21 NOTE — TELEPHONE ENCOUNTER
Verbal order and read back per Clarence Roberson MD  Please let the patient know that her echocardiogram was normal

## 2025-02-21 NOTE — TELEPHONE ENCOUNTER
----- Message from Dr. Clarence Roberson MD sent at 2/18/2025  3:42 PM EST -----  Please let the patient know that her echocardiogram was normal.  ----- Message -----  From: Jennifer Shirley MA  Sent: 2/18/2025   2:57 PM EST  To: Clarence Roberson MD    Per your last note \"  Chest tightness.  Atypical for angina.  Given her new diagnosis of hypertension and EKG suggesting LVH, I have recommended checking an echocardiogram to eval for any structural heart disease.

## 2025-02-24 ENCOUNTER — OFFICE VISIT (OUTPATIENT)
Age: 40
End: 2025-02-24
Payer: COMMERCIAL

## 2025-02-24 VITALS
DIASTOLIC BLOOD PRESSURE: 69 MMHG | HEIGHT: 62 IN | SYSTOLIC BLOOD PRESSURE: 129 MMHG | WEIGHT: 277.2 LBS | BODY MASS INDEX: 51.01 KG/M2 | RESPIRATION RATE: 18 BRPM | OXYGEN SATURATION: 97 % | TEMPERATURE: 97.8 F | HEART RATE: 84 BPM

## 2025-02-24 DIAGNOSIS — I10 ESSENTIAL HYPERTENSION: ICD-10-CM

## 2025-02-24 DIAGNOSIS — R73.03 PREDIABETES: ICD-10-CM

## 2025-02-24 DIAGNOSIS — G47.33 MODERATE OBSTRUCTIVE SLEEP APNEA: Primary | ICD-10-CM

## 2025-02-24 DIAGNOSIS — F90.9 ATTENTION DEFICIT HYPERACTIVITY DISORDER (ADHD), UNSPECIFIED ADHD TYPE: ICD-10-CM

## 2025-02-24 PROCEDURE — 1036F TOBACCO NON-USER: CPT | Performed by: OTOLARYNGOLOGY

## 2025-02-24 PROCEDURE — G8417 CALC BMI ABV UP PARAM F/U: HCPCS | Performed by: OTOLARYNGOLOGY

## 2025-02-24 PROCEDURE — 3078F DIAST BP <80 MM HG: CPT | Performed by: OTOLARYNGOLOGY

## 2025-02-24 PROCEDURE — 99214 OFFICE O/P EST MOD 30 MIN: CPT | Performed by: OTOLARYNGOLOGY

## 2025-02-24 PROCEDURE — G8427 DOCREV CUR MEDS BY ELIG CLIN: HCPCS | Performed by: OTOLARYNGOLOGY

## 2025-02-24 PROCEDURE — 3074F SYST BP LT 130 MM HG: CPT | Performed by: OTOLARYNGOLOGY

## 2025-02-24 ASSESSMENT — PATIENT HEALTH QUESTIONNAIRE - PHQ9
SUM OF ALL RESPONSES TO PHQ QUESTIONS 1-9: 0
SUM OF ALL RESPONSES TO PHQ9 QUESTIONS 1 & 2: 0
SUM OF ALL RESPONSES TO PHQ QUESTIONS 1-9: 0
SUM OF ALL RESPONSES TO PHQ QUESTIONS 1-9: 0
1. LITTLE INTEREST OR PLEASURE IN DOING THINGS: NOT AT ALL
SUM OF ALL RESPONSES TO PHQ QUESTIONS 1-9: 0
2. FEELING DOWN, DEPRESSED OR HOPELESS: NOT AT ALL

## 2025-02-24 ASSESSMENT — SLEEP AND FATIGUE QUESTIONNAIRES
HOW LIKELY ARE YOU TO NOD OFF OR FALL ASLEEP WHILE SITTING AND READING: MODERATE CHANCE OF DOZING
HOW LIKELY ARE YOU TO NOD OFF OR FALL ASLEEP WHILE SITTING INACTIVE IN A PUBLIC PLACE: SLIGHT CHANCE OF DOZING
HOW LIKELY ARE YOU TO NOD OFF OR FALL ASLEEP IN A CAR, WHILE STOPPED FOR A FEW MINUTES IN TRAFFIC: WOULD NEVER DOZE
HOW LIKELY ARE YOU TO NOD OFF OR FALL ASLEEP WHILE LYING DOWN TO REST IN THE AFTERNOON WHEN CIRCUMSTANCES PERMIT: MODERATE CHANCE OF DOZING
HOW LIKELY ARE YOU TO NOD OFF OR FALL ASLEEP WHILE SITTING AND TALKING TO SOMEONE: WOULD NEVER DOZE
HOW LIKELY ARE YOU TO NOD OFF OR FALL ASLEEP WHILE LYING DOWN TO REST IN THE AFTERNOON WHEN CIRCUMSTANCES PERMIT: MODERATE CHANCE OF DOZING
HOW LIKELY ARE YOU TO NOD OFF OR FALL ASLEEP WHILE SITTING AND READING: MODERATE CHANCE OF DOZING
HOW LIKELY ARE YOU TO NOD OFF OR FALL ASLEEP WHILE SITTING AND TALKING TO SOMEONE: WOULD NEVER DOZE
HOW LIKELY ARE YOU TO NOD OFF OR FALL ASLEEP WHILE SITTING INACTIVE IN A PUBLIC PLACE: SLIGHT CHANCE OF DOZING
HOW LIKELY ARE YOU TO NOD OFF OR FALL ASLEEP WHILE WATCHING TV: SLIGHT CHANCE OF DOZING
HOW LIKELY ARE YOU TO NOD OFF OR FALL ASLEEP WHEN YOU ARE A PASSENGER IN A CAR FOR AN HOUR WITHOUT A BREAK: MODERATE CHANCE OF DOZING
HOW LIKELY ARE YOU TO NOD OFF OR FALL ASLEEP WHILE SITTING QUIETLY AFTER LUNCH WITHOUT ALCOHOL: MODERATE CHANCE OF DOZING
HOW LIKELY ARE YOU TO NOD OFF OR FALL ASLEEP WHILE WATCHING TV: SLIGHT CHANCE OF DOZING
HOW LIKELY ARE YOU TO NOD OFF OR FALL ASLEEP IN A CAR, WHILE STOPPED FOR A FEW MINUTES IN TRAFFIC: WOULD NEVER DOZE
ESS TOTAL SCORE: 10
HOW LIKELY ARE YOU TO NOD OFF OR FALL ASLEEP WHILE SITTING QUIETLY AFTER LUNCH WITHOUT ALCOHOL: MODERATE CHANCE OF DOZING
HOW LIKELY ARE YOU TO NOD OFF OR FALL ASLEEP WHEN YOU ARE A PASSENGER IN A CAR FOR AN HOUR WITHOUT A BREAK: MODERATE CHANCE OF DOZING

## 2025-02-24 NOTE — PROGRESS NOTES
Deepthi TYE Salmeronll presents today for   Chief Complaint   Patient presents with    Follow-up     CPAP       Is someone accompanying this pt? no    Is the patient using any DME equipment during OV? no    -DME Company: [employer]     Depression Screenin/24/2025     4:04 PM   PHQ-9    Little interest or pleasure in doing things 0   Feeling down, depressed, or hopeless 0   PHQ-2 Score 0   PHQ-9 Total Score 0        Hobbs Sleepiness Scale:      2025     8:23 AM   Sleep Medicine   Sitting and reading 2   Watching TV 1   Sitting, inactive in a public place (e.g. a theatre or a meeting) 1   As a passenger in a car for an hour without a break 2   Lying down to rest in the afternoon when circumstances permit 2   Sitting and talking to someone 0   Sitting quietly after a lunch without alcohol 2   In a car, while stopped for a few minutes in traffic 0   Hobbs Sleepiness Score 10       Stop-Ban/17/2023     2:00 PM   STOP-BANG QUESTIONNAIRE   Are you a loud and/or regular snorer? 1   Do you often feel tired or groggy upon awakening or do you awaken with a headache? 1   Have you been observed to gasp or stop breathing during sleep? 0   Are you often tired or fatigued during wake time hours?  0   Do you fall asleep sitting, reading, watching TV or driving? 0   Do you often have problems with memory or concentration? 0   Do you have or are you being treated for high blood pressure? 0   Recent BMI (Calculated) 49.2   Is BMI greater than 35 kg/m2? 1=Yes   Age older than 50 years old? 0=No   Is your neck circumference greater than 17 inches (Male) or 16 inches (Female)? 0   Gender - Male 0=No   STOP-Bang Total Score 52.2           Coordination of Care:  1. Have you been to the ER, urgent care clinic since your last visit? Hospitalized since your last visit? yes.     2. Have you seen or consulted any other health care providers outside of the [facility] since your last visit? Include any pap smears or

## 2025-02-24 NOTE — ASSESSMENT & PLAN NOTE
Chronic, at goal (stable), continue current treatment plan, is now using an AirFit P30i nasal pillows mask, which she likes better. No specific complaints with respect to sleep today. Is getting benefit from CPAP use.    In reviewing her download data from her CPAP machine over the past 90 days, her compliance for 4 hours usage or more is 99%.  Her residual sleep apnea is low at 0.4 and she is using her machine, on average, 7-1/2 hours per night.    Her settings appear to be appropriate so we will continue those.    Her Hooks score is a little higher than it has been in the past but she said that is \"situational\" and I suspect also because she was taken off her ADHD medications.    I will place an/order for supplies and we will plan on seeing her back in 1 year if she continues to do well

## 2025-02-24 NOTE — PATIENT INSTRUCTIONS
Please make a follow up appointment to discuss the results of your sleep study. If this is impossible for some reason, please send me a \"My Chart\" message so that I may get back with you in a timely manner.    The [facility] Lab is located in the [employer] [facility], adjacent to [facility]. The lab is on the second floor. The direct number to call for sleep study related questions is: [phone].    Please call our clinic back at [phone] or send a message on [employer]! if you have any questions or concerns or if you are experiencing any of the following:     You have not received a follow up appointment within 30 days prior the recommended follow up time.    If you are not tolerating treatment plan and/or not able to obtain equipment or prescribed medication(s).  if you are experiencing any difficulties with the Durable Medical Equipment  (DME) Company you may be using or is assigned to you.  Two weeks have passed and you have not received an appointment for a scheduled procedure.  Two weeks have passed since you underwent a test and/or procedure and you have not received your results.     If you are using a CPAP/BIPAP, or Home Ventilator Device- Please note the following.  Currently, many DMEs are experiencing supply chain difficulties and orders for equipment may be back logged several weeks.     Your  Durable Medical Equipment (DME ) company is supposed to provide you with replacement filters, tubing and masks. You can either call your DME when you need new supplies or you can arrange for an automatic shipment schedule.    Your need to be seen by our office at lat minimum of every 12 months in order to renew the prescription for these supplies.   Please make note of who your DME company is and their phone number.   Please make sure that you clean your mask and hosing on a regular basis.  Your DME can provide you with additional information regarding proper care and cleaning of your

## 2025-02-25 ENCOUNTER — CLINICAL DOCUMENTATION (OUTPATIENT)
Age: 40
End: 2025-02-25

## 2025-03-17 ENCOUNTER — OFFICE VISIT (OUTPATIENT)
Age: 40
End: 2025-03-17
Payer: COMMERCIAL

## 2025-03-17 VITALS
SYSTOLIC BLOOD PRESSURE: 116 MMHG | TEMPERATURE: 97 F | WEIGHT: 278 LBS | DIASTOLIC BLOOD PRESSURE: 72 MMHG | HEIGHT: 62 IN | HEART RATE: 88 BPM | OXYGEN SATURATION: 99 % | BODY MASS INDEX: 51.16 KG/M2

## 2025-03-17 DIAGNOSIS — G89.29 CHRONIC NONINTRACTABLE HEADACHE, UNSPECIFIED HEADACHE TYPE: Primary | ICD-10-CM

## 2025-03-17 DIAGNOSIS — R51.9 CHRONIC NONINTRACTABLE HEADACHE, UNSPECIFIED HEADACHE TYPE: Primary | ICD-10-CM

## 2025-03-17 DIAGNOSIS — R20.2 NUMBNESS AND TINGLING IN BOTH HANDS: ICD-10-CM

## 2025-03-17 DIAGNOSIS — R20.0 NUMBNESS AND TINGLING IN BOTH HANDS: ICD-10-CM

## 2025-03-17 PROCEDURE — G8417 CALC BMI ABV UP PARAM F/U: HCPCS | Performed by: NURSE PRACTITIONER

## 2025-03-17 PROCEDURE — 3078F DIAST BP <80 MM HG: CPT | Performed by: NURSE PRACTITIONER

## 2025-03-17 PROCEDURE — 99204 OFFICE O/P NEW MOD 45 MIN: CPT | Performed by: NURSE PRACTITIONER

## 2025-03-17 PROCEDURE — 3074F SYST BP LT 130 MM HG: CPT | Performed by: NURSE PRACTITIONER

## 2025-03-17 PROCEDURE — G8427 DOCREV CUR MEDS BY ELIG CLIN: HCPCS | Performed by: NURSE PRACTITIONER

## 2025-03-17 PROCEDURE — 1036F TOBACCO NON-USER: CPT | Performed by: NURSE PRACTITIONER

## 2025-03-17 NOTE — PROGRESS NOTES
patient.    Diagnoses and all orders for this visit:    Chronic nonintractable headache, unspecified headache type    Numbness and tingling in both hands  -     Vitamin B12 & Folate; Future  -     EMG TWO EXTREMITIES UPPER; Future        I spent 45 minutes with the patient in face-to-face consultation, with 25 minutes spent in counseling and coordination of care as described above.      Signed By: LYNNE ISLAS NP              PLEASE NOTE:   Portions of this document may have been produced using voice recognition software. Unrecognized errors in transcription may be present.

## 2025-03-17 NOTE — PATIENT INSTRUCTIONS
December 29, 2022       Patient: Elena Martell   YOB: 1972   Date of Visit: 12/29/2022         To Whom It May Concern:    In my medical opinion, I recommend that Elena Martell be excused from work 12/29/2022 due to illness.     If you have any questions or concerns, please don't hesitate to call [phone]          Sincerely,          Cristy Jones A.P.R.N.  Electronically Signed      Patient instructions:  -track the headaches    -use OTC analgesic as needed such as ibuprofen or naproxen.  Or the Fioricet as needed.      -lab: vit B12 level and folate    -EMG/nerve conduction study to be done here    -wrist braces at night    -we will request eye exam notes    -can try for headache prevention: magnesium glycinate or magnesium oxide 200 mg twice daily or 400 mg daily (try 400 mg at night), and/or vitamin B2/riboflavin 400 mg daily.

## 2025-07-03 ENCOUNTER — TELEPHONE (OUTPATIENT)
Age: 40
End: 2025-07-03

## 2025-07-03 DIAGNOSIS — R20.2 NUMBNESS AND TINGLING IN BOTH HANDS: Primary | ICD-10-CM

## 2025-07-03 DIAGNOSIS — R20.0 NUMBNESS AND TINGLING IN BOTH HANDS: Primary | ICD-10-CM

## 2025-07-12 LAB
A/G RATIO: 1.1 RATIO (ref 1.1–2.6)
ALBUMIN: 4.2 G/DL (ref 3.5–5)
ALP BLD-CCNC: 60 U/L (ref 25–115)
ALT SERPL-CCNC: 15 U/L (ref 5–40)
ANION GAP SERPL CALCULATED.3IONS-SCNC: 13 MMOL/L (ref 3–15)
AST SERPL-CCNC: 14 U/L (ref 10–37)
BASOPHILS # BLD: 0 % (ref 0–2)
BASOPHILS ABSOLUTE: 0 K/UL (ref 0–0.2)
BILIRUB SERPL-MCNC: 0.2 MG/DL (ref 0.2–1.2)
BUN BLDV-MCNC: 12 MG/DL (ref 6–22)
CALCIUM SERPL-MCNC: 9.4 MG/DL (ref 8.4–10.5)
CHLORIDE BLD-SCNC: 102 MMOL/L (ref 98–110)
CHOLESTEROL, TOTAL: 229 MG/DL (ref 110–200)
CHOLESTEROL/HDL RATIO: 2.9 (ref 0–5)
CO2: 25 MMOL/L (ref 20–32)
CREAT SERPL-MCNC: 0.9 MG/DL (ref 0.5–1.2)
CREATININE, URINE  MG/DL: 232 MG/DL
EOSINOPHIL # BLD: 1 % (ref 0–6)
EOSINOPHILS ABSOLUTE: 0.1 K/UL (ref 0–0.5)
ESTIMATED AVERAGE GLUCOSE: 115 MG/DL (ref 91–123)
FOLATE: 9.7 NG/ML
GFR, ESTIMATED: 77 ML/MIN/1.73 SQ.M.
GLOBULIN: 3.9 G/DL (ref 2–4)
GLUCOSE: 99 MG/DL (ref 70–99)
HBA1C MFR BLD: 5.6 % (ref 4.8–5.6)
HCT VFR BLD CALC: 38.4 % (ref 35.1–46.5)
HDLC SERPL-MCNC: 80 MG/DL
HEMOGLOBIN: 12.6 G/DL (ref 11.7–15.5)
LDL CHOLESTEROL: 139 MG/DL (ref 50–99)
LDL/HDL RATIO: 1.7
LYMPHOCYTES # BLD: 40 % (ref 20–45)
LYMPHOCYTES ABSOLUTE: 2.4 K/UL (ref 1–4.8)
MCH RBC QN AUTO: 28 PG (ref 26–34)
MCHC RBC AUTO-ENTMCNC: 33 G/DL (ref 31–36)
MCV RBC AUTO: 86 FL (ref 80–99)
MICROALBUMIN/CREAT 24H UR: 65.3 MG/L (ref 0.1–17)
MICROALBUMIN/CREAT UR-RTO: 28.1 (ref 0–30)
MONOCYTES ABSOLUTE: 0.5 K/UL (ref 0.1–1)
MONOCYTES: 8 % (ref 3–12)
NEUTROPHILS ABSOLUTE: 3.1 K/UL (ref 1.8–7.7)
NEUTROPHILS SEGMENTED: 51 % (ref 40–75)
NON-HDL CHOLESTEROL: 149 MG/DL
PDW BLD-RTO: 12.8 % (ref 10–15.5)
PLATELET # BLD: 453 K/UL (ref 140–440)
PMV BLD AUTO: 8.9 FL (ref 9–13)
POTASSIUM SERPL-SCNC: 4.1 MMOL/L (ref 3.5–5.5)
RBC # BLD: 4.49 M/UL (ref 3.8–5.2)
SODIUM BLD-SCNC: 140 MMOL/L (ref 133–145)
TOTAL PROTEIN: 8.1 G/DL (ref 6.4–8.3)
TRIGL SERPL-MCNC: 47 MG/DL (ref 40–149)
VITAMIN B-12: 804 PG/ML (ref 211–911)
VLDLC SERPL CALC-MCNC: 9 MG/DL (ref 8–30)
WBC # BLD: 6.1 K/UL (ref 4–11)

## 2025-07-14 ENCOUNTER — TELEMEDICINE (OUTPATIENT)
Facility: CLINIC | Age: 40
End: 2025-07-14
Payer: COMMERCIAL

## 2025-07-14 DIAGNOSIS — E78.5 HYPERLIPIDEMIA, UNSPECIFIED HYPERLIPIDEMIA TYPE: Primary | ICD-10-CM

## 2025-07-14 DIAGNOSIS — R51.9 NONINTRACTABLE HEADACHE, UNSPECIFIED CHRONICITY PATTERN, UNSPECIFIED HEADACHE TYPE: ICD-10-CM

## 2025-07-14 DIAGNOSIS — Z87.898 HISTORY OF PREDIABETES: ICD-10-CM

## 2025-07-14 DIAGNOSIS — R79.89 ABNORMAL CBC: ICD-10-CM

## 2025-07-14 DIAGNOSIS — I10 HYPERTENSION, UNSPECIFIED TYPE: ICD-10-CM

## 2025-07-14 PROCEDURE — 99214 OFFICE O/P EST MOD 30 MIN: CPT | Performed by: INTERNAL MEDICINE

## 2025-07-14 PROCEDURE — G8428 CUR MEDS NOT DOCUMENT: HCPCS | Performed by: INTERNAL MEDICINE

## 2025-07-14 NOTE — PROGRESS NOTES
warnings with the patient as well. I have reviewed the plan of care with the patient, accepted their input and they are in agreement with the treatment goals. All questions were answered. The patient understands the plan of care. Pt instructed if symptoms worsen to call the office or report to the ED for continued care.  Greater than 50% of the visit time was spent in counseling and/or coordination of care.     Voice recognition was used to generate this report, which may have resulted in some phonetic based errors in grammar and contents. Even though attempts were made to correct all the mistakes, some may have been missed, and remained in the body of the document.      Subjective:   Deepthi GAMBLE Bentley was seen for   Chief Complaint   Patient presents with    Follow-up     She is a 39yo female with h/o thrombocytosis (followed by [facility]), previous positive PPD, Covid-19,  prediabetes, uterine fibroids, IMAN (on CPAP), Glaucoma (followed by ), GERD, microscopic hematuria (evaluated by [facility]), asthma, ADHD per neuropsychological testing (8/24/22), anxiety, allergic rhinitis, HLD, and obesity.      1. Hypertension: Treated with losartan 100 mg daily and HCTZ 12.5mg daily in place of HCTZ 12.5mg daily + candesartan 8 mg daily due to the cost of candesartan, earlier this year.  Home blood pressure checks: none in 2 months.  Side effects from losartan: none.  July lab work shows no microalbuminuria.  Earlier this year she had atypical chest pain that responded well to omeprazole.  Asymptomatic.    She has chronic headaches and has been referred to neurology in the past.  She met with them in between appointments.  EMG testing was recommended for suspected carpal tunnel syndrome but she never had this testing done since her symptoms were mild.  She occasionally has paresthesias in her fingers.  Headache symptoms are unchanged.    2. HLD and Prediabetes Hx: July 12 labs show that her

## 2025-07-18 ENCOUNTER — RESULTS FOLLOW-UP (OUTPATIENT)
Age: 40
End: 2025-07-18

## 2025-07-18 NOTE — TELEPHONE ENCOUNTER
Spoke to pt and let her know her labs were normal.    ----- Message from LYNNE Campos NP sent at 7/14/2025  8:46 AM EDT -----  Please notify patient on lab results: vitamin B12 and folate were normal.  ----- Message -----  From: Scot, [facility] Lab Results [facility]  Sent: 7/12/2025  12:07 PM EDT  To: LYNNE Perla NP